# Patient Record
Sex: MALE | Race: WHITE | NOT HISPANIC OR LATINO | Employment: UNEMPLOYED | URBAN - METROPOLITAN AREA
[De-identification: names, ages, dates, MRNs, and addresses within clinical notes are randomized per-mention and may not be internally consistent; named-entity substitution may affect disease eponyms.]

---

## 2019-01-01 ENCOUNTER — HOSPITAL ENCOUNTER (INPATIENT)
Facility: HOSPITAL | Age: 74
LOS: 5 days | DRG: 023 | End: 2019-07-09
Attending: EMERGENCY MEDICINE | Admitting: PSYCHIATRY & NEUROLOGY
Payer: MEDICARE

## 2019-01-01 ENCOUNTER — HOSPITAL ENCOUNTER (OUTPATIENT)
Dept: TELEMEDICINE | Facility: HOSPITAL | Age: 74
Discharge: HOME | End: 2019-07-03
Payer: MEDICARE

## 2019-01-01 VITALS
DIASTOLIC BLOOD PRESSURE: 97 MMHG | HEIGHT: 71 IN | WEIGHT: 132 LBS | SYSTOLIC BLOOD PRESSURE: 163 MMHG | OXYGEN SATURATION: 100 % | BODY MASS INDEX: 18.48 KG/M2 | HEART RATE: 81 BPM | TEMPERATURE: 98 F | RESPIRATION RATE: 20 BRPM

## 2019-01-01 DIAGNOSIS — Z51.5 PALLIATIVE CARE ENCOUNTER: ICD-10-CM

## 2019-01-01 DIAGNOSIS — I63.412 CEREBRAL INFARCTION DUE TO EMBOLISM OF LEFT MIDDLE CEREBRAL ARTERY: ICD-10-CM

## 2019-01-01 DIAGNOSIS — Z45.2 ENCOUNTER FOR CENTRAL LINE PLACEMENT: ICD-10-CM

## 2019-01-01 DIAGNOSIS — R79.89 ELEVATED TROPONIN: ICD-10-CM

## 2019-01-01 DIAGNOSIS — I42.9 CARDIOMYOPATHY, UNSPECIFIED TYPE: ICD-10-CM

## 2019-01-01 DIAGNOSIS — Z71.89 GOALS OF CARE, COUNSELING/DISCUSSION: ICD-10-CM

## 2019-01-01 DIAGNOSIS — I50.41 ACUTE COMBINED SYSTOLIC (CONGESTIVE) AND DIASTOLIC (CONGESTIVE) HEART FAILURE: ICD-10-CM

## 2019-01-01 DIAGNOSIS — I63.9 CEREBROVASCULAR ACCIDENT (CVA), UNSPECIFIED MECHANISM: Primary | ICD-10-CM

## 2019-01-01 DIAGNOSIS — I21.4 NSTEMI (NON-ST ELEVATED MYOCARDIAL INFARCTION): ICD-10-CM

## 2019-01-01 DIAGNOSIS — I63.9 CVA (CEREBRAL VASCULAR ACCIDENT): ICD-10-CM

## 2019-01-01 DIAGNOSIS — J93.9 PNEUMOTHORAX, UNSPECIFIED TYPE: ICD-10-CM

## 2019-01-01 LAB
ABO + RH BLD: NORMAL
ABO + RH BLD: NORMAL
ALBUMIN SERPL BCP-MCNC: 1.7 G/DL (ref 3.5–5.2)
ALBUMIN SERPL BCP-MCNC: 1.7 G/DL (ref 3.5–5.2)
ALBUMIN SERPL BCP-MCNC: 1.8 G/DL (ref 3.5–5.2)
ALBUMIN SERPL BCP-MCNC: 1.9 G/DL (ref 3.5–5.2)
ALBUMIN SERPL BCP-MCNC: 2.2 G/DL (ref 3.5–5.2)
ALBUMIN SERPL BCP-MCNC: 2.3 G/DL (ref 3.5–5.2)
ALBUMIN SERPL BCP-MCNC: 2.4 G/DL (ref 3.5–5.2)
ALBUMIN SERPL BCP-MCNC: 2.6 G/DL (ref 3.5–5.2)
ALLENS TEST: ABNORMAL
ALP SERPL-CCNC: 47 U/L (ref 55–135)
ALP SERPL-CCNC: 50 U/L (ref 55–135)
ALP SERPL-CCNC: 55 U/L (ref 55–135)
ALP SERPL-CCNC: 56 U/L (ref 55–135)
ALP SERPL-CCNC: 56 U/L (ref 55–135)
ALP SERPL-CCNC: 60 U/L (ref 55–135)
ALP SERPL-CCNC: 65 U/L (ref 55–135)
ALP SERPL-CCNC: 73 U/L (ref 55–135)
ALT SERPL W/O P-5'-P-CCNC: 1173 U/L (ref 10–44)
ALT SERPL W/O P-5'-P-CCNC: 1174 U/L (ref 10–44)
ALT SERPL W/O P-5'-P-CCNC: 1197 U/L (ref 10–44)
ALT SERPL W/O P-5'-P-CCNC: 1222 U/L (ref 10–44)
ALT SERPL W/O P-5'-P-CCNC: 1251 U/L (ref 10–44)
ALT SERPL W/O P-5'-P-CCNC: 1279 U/L (ref 10–44)
ALT SERPL W/O P-5'-P-CCNC: 1291 U/L (ref 10–44)
ALT SERPL W/O P-5'-P-CCNC: 24 U/L (ref 10–44)
AMMONIA PLAS-SCNC: 59 UMOL/L (ref 10–50)
AMMONIA PLAS-SCNC: 59 UMOL/L (ref 10–50)
ANION GAP SERPL CALC-SCNC: 10 MMOL/L (ref 8–16)
ANION GAP SERPL CALC-SCNC: 3 MMOL/L (ref 8–16)
ANION GAP SERPL CALC-SCNC: 4 MMOL/L (ref 8–16)
ANION GAP SERPL CALC-SCNC: 6 MMOL/L (ref 8–16)
ANION GAP SERPL CALC-SCNC: 7 MMOL/L (ref 8–16)
ANION GAP SERPL CALC-SCNC: 7 MMOL/L (ref 8–16)
ANION GAP SERPL CALC-SCNC: 8 MMOL/L (ref 8–16)
ANION GAP SERPL CALC-SCNC: ABNORMAL MMOL/L (ref 8–16)
ANISOCYTOSIS BLD QL SMEAR: SLIGHT
APTT BLDCRRT: 29.8 SEC (ref 21–32)
ASCENDING AORTA: 2.83 CM
AST SERPL-CCNC: 1029 U/L (ref 10–40)
AST SERPL-CCNC: 1261 U/L (ref 10–40)
AST SERPL-CCNC: 1329 U/L (ref 10–40)
AST SERPL-CCNC: 1390 U/L (ref 10–40)
AST SERPL-CCNC: 1430 U/L (ref 10–40)
AST SERPL-CCNC: 1506 U/L (ref 10–40)
AST SERPL-CCNC: 2273 U/L (ref 10–40)
AST SERPL-CCNC: 34 U/L (ref 10–40)
AV INDEX (PROSTH): 0.65
AV MEAN GRADIENT: 3 MMHG
AV PEAK GRADIENT: 5 MMHG
AV VALVE AREA: 2.17 CM2
AV VELOCITY RATIO: 0.67
BACTERIA #/AREA URNS AUTO: ABNORMAL /HPF
BASOPHILS # BLD AUTO: 0.01 K/UL (ref 0–0.2)
BASOPHILS # BLD AUTO: 0.02 K/UL (ref 0–0.2)
BASOPHILS # BLD AUTO: 0.02 K/UL (ref 0–0.2)
BASOPHILS # BLD AUTO: 0.03 K/UL (ref 0–0.2)
BASOPHILS NFR BLD: 0.1 % (ref 0–1.9)
BASOPHILS NFR BLD: 0.2 % (ref 0–1.9)
BASOPHILS NFR BLD: 0.3 % (ref 0–1.9)
BILIRUB SERPL-MCNC: 0.4 MG/DL (ref 0.1–1)
BILIRUB SERPL-MCNC: 0.4 MG/DL (ref 0.1–1)
BILIRUB SERPL-MCNC: 0.5 MG/DL (ref 0.1–1)
BILIRUB SERPL-MCNC: 0.5 MG/DL (ref 0.1–1)
BILIRUB SERPL-MCNC: 0.6 MG/DL (ref 0.1–1)
BILIRUB SERPL-MCNC: 1.2 MG/DL (ref 0.1–1)
BILIRUB UR QL STRIP: NEGATIVE
BLD GP AB SCN CELLS X3 SERPL QL: NORMAL
BLD GP AB SCN CELLS X3 SERPL QL: NORMAL
BLD PROD TYP BPU: NORMAL
BLD PROD TYP BPU: NORMAL
BLOOD UNIT EXPIRATION DATE: NORMAL
BLOOD UNIT EXPIRATION DATE: NORMAL
BLOOD UNIT TYPE CODE: 6200
BLOOD UNIT TYPE CODE: 6200
BLOOD UNIT TYPE: NORMAL
BLOOD UNIT TYPE: NORMAL
BNP SERPL-MCNC: 2672 PG/ML (ref 0–99)
BNP SERPL-MCNC: 3219 PG/ML (ref 0–99)
BNP SERPL-MCNC: 3600 PG/ML (ref 0–99)
BNP SERPL-MCNC: >4900 PG/ML (ref 0–99)
BSA FOR ECHO PROCEDURE: 1.73 M2
BUN SERPL-MCNC: 26 MG/DL (ref 8–23)
BUN SERPL-MCNC: 41 MG/DL (ref 8–23)
BUN SERPL-MCNC: 43 MG/DL (ref 8–23)
BUN SERPL-MCNC: 45 MG/DL (ref 8–23)
BUN SERPL-MCNC: 46 MG/DL (ref 8–23)
BUN SERPL-MCNC: 50 MG/DL (ref 8–23)
BUN SERPL-MCNC: 52 MG/DL (ref 8–23)
BUN SERPL-MCNC: 59 MG/DL (ref 8–23)
CALCIUM SERPL-MCNC: 7.9 MG/DL (ref 8.7–10.5)
CALCIUM SERPL-MCNC: 8 MG/DL (ref 8.7–10.5)
CALCIUM SERPL-MCNC: 8.1 MG/DL (ref 8.7–10.5)
CALCIUM SERPL-MCNC: 8.1 MG/DL (ref 8.7–10.5)
CALCIUM SERPL-MCNC: 8.2 MG/DL (ref 8.7–10.5)
CALCIUM SERPL-MCNC: 8.4 MG/DL (ref 8.7–10.5)
CHLORIDE SERPL-SCNC: 109 MMOL/L (ref 95–110)
CHLORIDE SERPL-SCNC: 111 MMOL/L (ref 95–110)
CHLORIDE SERPL-SCNC: 112 MMOL/L (ref 95–110)
CHLORIDE SERPL-SCNC: 123 MMOL/L (ref 95–110)
CHLORIDE SERPL-SCNC: 129 MMOL/L (ref 95–110)
CHLORIDE SERPL-SCNC: 129 MMOL/L (ref 95–110)
CHLORIDE SERPL-SCNC: 130 MMOL/L (ref 95–110)
CHLORIDE SERPL-SCNC: >130 MMOL/L (ref 95–110)
CHOLEST SERPL-MCNC: 170 MG/DL (ref 120–199)
CHOLEST/HDLC SERPL: 5 {RATIO} (ref 2–5)
CLARITY UR REFRACT.AUTO: ABNORMAL
CO2 SERPL-SCNC: 14 MMOL/L (ref 23–29)
CO2 SERPL-SCNC: 16 MMOL/L (ref 23–29)
CO2 SERPL-SCNC: 17 MMOL/L (ref 23–29)
CO2 SERPL-SCNC: 22 MMOL/L (ref 23–29)
CO2 SERPL-SCNC: 23 MMOL/L (ref 23–29)
CO2 SERPL-SCNC: 23 MMOL/L (ref 23–29)
CODING SYSTEM: NORMAL
CODING SYSTEM: NORMAL
COLOR UR AUTO: YELLOW
CREAT SERPL-MCNC: 1.8 MG/DL (ref 0.5–1.4)
CREAT SERPL-MCNC: 2.4 MG/DL (ref 0.5–1.4)
CREAT SERPL-MCNC: 2.5 MG/DL (ref 0.5–1.4)
CREAT SERPL-MCNC: 2.6 MG/DL (ref 0.5–1.4)
CREAT SERPL-MCNC: 2.8 MG/DL (ref 0.5–1.4)
CREAT SERPL-MCNC: 2.9 MG/DL (ref 0.5–1.4)
CV ECHO LV RWT: 0.33 CM
DELSYS: ABNORMAL
DIFFERENTIAL METHOD: ABNORMAL
DISPENSE STATUS: NORMAL
DISPENSE STATUS: NORMAL
DOHLE BOD BLD QL SMEAR: PRESENT
DOHLE BOD BLD QL SMEAR: PRESENT
DOP CALC AO PEAK VEL: 1.08 M/S
DOP CALC AO VTI: 20.95 CM
DOP CALC LVOT AREA: 3.3 CM2
DOP CALC LVOT DIAMETER: 2.06 CM
DOP CALC LVOT PEAK VEL: 0.72 M/S
DOP CALC LVOT STROKE VOLUME: 45.4 CM3
DOP CALCLVOT PEAK VEL VTI: 13.63 CM
E WAVE DECELERATION TIME: 182.61 MSEC
E/A RATIO: 1.05
E/E' RATIO: 8.53 M/S
ECHO LV POSTERIOR WALL: 1.04 CM (ref 0.6–1.1)
EOSINOPHIL # BLD AUTO: 0 K/UL (ref 0–0.5)
EOSINOPHIL NFR BLD: 0 % (ref 0–8)
EOSINOPHIL NFR BLD: 0.1 % (ref 0–8)
ERYTHROCYTE [DISTWIDTH] IN BLOOD BY AUTOMATED COUNT: 15.9 % (ref 11.5–14.5)
ERYTHROCYTE [DISTWIDTH] IN BLOOD BY AUTOMATED COUNT: 15.9 % (ref 11.5–14.5)
ERYTHROCYTE [DISTWIDTH] IN BLOOD BY AUTOMATED COUNT: 16.5 % (ref 11.5–14.5)
ERYTHROCYTE [DISTWIDTH] IN BLOOD BY AUTOMATED COUNT: 16.5 % (ref 11.5–14.5)
ERYTHROCYTE [DISTWIDTH] IN BLOOD BY AUTOMATED COUNT: 16.7 % (ref 11.5–14.5)
ERYTHROCYTE [DISTWIDTH] IN BLOOD BY AUTOMATED COUNT: 16.8 % (ref 11.5–14.5)
ERYTHROCYTE [DISTWIDTH] IN BLOOD BY AUTOMATED COUNT: 17.2 % (ref 11.5–14.5)
ERYTHROCYTE [DISTWIDTH] IN BLOOD BY AUTOMATED COUNT: 17.3 % (ref 11.5–14.5)
ERYTHROCYTE [DISTWIDTH] IN BLOOD BY AUTOMATED COUNT: 18 % (ref 11.5–14.5)
ERYTHROCYTE [DISTWIDTH] IN BLOOD BY AUTOMATED COUNT: 18.5 % (ref 11.5–14.5)
ERYTHROCYTE [DISTWIDTH] IN BLOOD BY AUTOMATED COUNT: 18.7 % (ref 11.5–14.5)
ERYTHROCYTE [SEDIMENTATION RATE] IN BLOOD BY WESTERGREN METHOD: 12 MM/H
ERYTHROCYTE [SEDIMENTATION RATE] IN BLOOD BY WESTERGREN METHOD: 16 MM/H
ERYTHROCYTE [SEDIMENTATION RATE] IN BLOOD BY WESTERGREN METHOD: 20 MM/H
ERYTHROCYTE [SEDIMENTATION RATE] IN BLOOD BY WESTERGREN METHOD: 24 MM/H
EST. GFR  (AFRICAN AMERICAN): 23.9 ML/MIN/1.73 M^2
EST. GFR  (AFRICAN AMERICAN): 24.9 ML/MIN/1.73 M^2
EST. GFR  (AFRICAN AMERICAN): 27.3 ML/MIN/1.73 M^2
EST. GFR  (AFRICAN AMERICAN): 28.6 ML/MIN/1.73 M^2
EST. GFR  (AFRICAN AMERICAN): 30 ML/MIN/1.73 M^2
EST. GFR  (AFRICAN AMERICAN): 42.5 ML/MIN/1.73 M^2
EST. GFR  (NON AFRICAN AMERICAN): 20.7 ML/MIN/1.73 M^2
EST. GFR  (NON AFRICAN AMERICAN): 21.6 ML/MIN/1.73 M^2
EST. GFR  (NON AFRICAN AMERICAN): 23.6 ML/MIN/1.73 M^2
EST. GFR  (NON AFRICAN AMERICAN): 24.7 ML/MIN/1.73 M^2
EST. GFR  (NON AFRICAN AMERICAN): 26 ML/MIN/1.73 M^2
EST. GFR  (NON AFRICAN AMERICAN): 36.8 ML/MIN/1.73 M^2
ESTIMATED AVG GLUCOSE: 103 MG/DL (ref 68–131)
FIBRINOGEN PPP-MCNC: 164 MG/DL (ref 182–366)
FIO2: 100
FIO2: 40
FIO2: 40
FIO2: 50
FLOW: 7
FLOW: 7
FRACTIONAL SHORTENING: 21 % (ref 28–44)
GLUCOSE SERPL-MCNC: 100 MG/DL (ref 70–110)
GLUCOSE SERPL-MCNC: 103 MG/DL (ref 70–110)
GLUCOSE SERPL-MCNC: 111 MG/DL (ref 70–110)
GLUCOSE SERPL-MCNC: 119 MG/DL (ref 70–110)
GLUCOSE SERPL-MCNC: 134 MG/DL (ref 70–110)
GLUCOSE SERPL-MCNC: 164 MG/DL (ref 70–110)
GLUCOSE SERPL-MCNC: 208 MG/DL (ref 70–110)
GLUCOSE SERPL-MCNC: 228 MG/DL (ref 70–110)
GLUCOSE UR QL STRIP: NEGATIVE
HBA1C MFR BLD HPLC: 5.2 % (ref 4–5.6)
HCO3 UR-SCNC: 11.4 MMOL/L (ref 24–28)
HCO3 UR-SCNC: 13.5 MMOL/L (ref 24–28)
HCO3 UR-SCNC: 13.5 MMOL/L (ref 24–28)
HCO3 UR-SCNC: 17.4 MMOL/L (ref 24–28)
HCO3 UR-SCNC: 17.4 MMOL/L (ref 24–28)
HCO3 UR-SCNC: 18 MMOL/L (ref 24–28)
HCO3 UR-SCNC: 18.1 MMOL/L (ref 24–28)
HCO3 UR-SCNC: 18.5 MMOL/L (ref 24–28)
HCO3 UR-SCNC: 18.8 MMOL/L (ref 24–28)
HCO3 UR-SCNC: 19.1 MMOL/L (ref 24–28)
HCO3 UR-SCNC: 19.7 MMOL/L (ref 24–28)
HCO3 UR-SCNC: 19.8 MMOL/L (ref 24–28)
HCO3 UR-SCNC: 20.6 MMOL/L (ref 24–28)
HCO3 UR-SCNC: 22.8 MMOL/L (ref 24–28)
HCO3 UR-SCNC: 22.8 MMOL/L (ref 24–28)
HCO3 UR-SCNC: 22.9 MMOL/L (ref 24–28)
HCO3 UR-SCNC: 23.7 MMOL/L (ref 24–28)
HCO3 UR-SCNC: 24.7 MMOL/L (ref 24–28)
HCO3 UR-SCNC: 25.9 MMOL/L (ref 24–28)
HCO3 UR-SCNC: 25.9 MMOL/L (ref 24–28)
HCO3 UR-SCNC: 29.2 MMOL/L (ref 24–28)
HCO3 UR-SCNC: 31.8 MMOL/L (ref 24–28)
HCT VFR BLD AUTO: 19.5 % (ref 40–54)
HCT VFR BLD AUTO: 20.2 % (ref 40–54)
HCT VFR BLD AUTO: 22.6 % (ref 40–54)
HCT VFR BLD AUTO: 23.5 % (ref 40–54)
HCT VFR BLD AUTO: 23.5 % (ref 40–54)
HCT VFR BLD AUTO: 25 % (ref 40–54)
HCT VFR BLD AUTO: 29 % (ref 40–54)
HCT VFR BLD AUTO: 30 % (ref 40–54)
HCT VFR BLD AUTO: 31.1 % (ref 40–54)
HCT VFR BLD AUTO: 31.2 % (ref 40–54)
HCT VFR BLD AUTO: 35.4 % (ref 40–54)
HCT VFR BLD CALC: 22 %PCV (ref 36–54)
HCT VFR BLD CALC: 23 %PCV (ref 36–54)
HCT VFR BLD CALC: 24 %PCV (ref 36–54)
HCT VFR BLD CALC: 26 %PCV (ref 36–54)
HCT VFR BLD CALC: 33 %PCV (ref 36–54)
HDLC SERPL-MCNC: 34 MG/DL (ref 40–75)
HDLC SERPL: 20 % (ref 20–50)
HGB BLD-MCNC: 11.4 G/DL (ref 14–18)
HGB BLD-MCNC: 6.3 G/DL (ref 14–18)
HGB BLD-MCNC: 6.7 G/DL (ref 14–18)
HGB BLD-MCNC: 7.1 G/DL (ref 14–18)
HGB BLD-MCNC: 7.6 G/DL (ref 14–18)
HGB BLD-MCNC: 7.6 G/DL (ref 14–18)
HGB BLD-MCNC: 8.4 G/DL (ref 14–18)
HGB BLD-MCNC: 9.4 G/DL (ref 14–18)
HGB BLD-MCNC: 9.6 G/DL (ref 14–18)
HGB BLD-MCNC: 9.6 G/DL (ref 14–18)
HGB BLD-MCNC: 9.7 G/DL (ref 14–18)
HGB UR QL STRIP: ABNORMAL
HYALINE CASTS UR QL AUTO: 0 /LPF
HYPOCHROMIA BLD QL SMEAR: ABNORMAL
IMM GRANULOCYTES # BLD AUTO: 0.04 K/UL (ref 0–0.04)
IMM GRANULOCYTES # BLD AUTO: 0.06 K/UL (ref 0–0.04)
IMM GRANULOCYTES # BLD AUTO: 0.07 K/UL (ref 0–0.04)
IMM GRANULOCYTES # BLD AUTO: 0.07 K/UL (ref 0–0.04)
IMM GRANULOCYTES # BLD AUTO: 0.08 K/UL (ref 0–0.04)
IMM GRANULOCYTES # BLD AUTO: 0.09 K/UL (ref 0–0.04)
IMM GRANULOCYTES # BLD AUTO: 0.09 K/UL (ref 0–0.04)
IMM GRANULOCYTES # BLD AUTO: 0.11 K/UL (ref 0–0.04)
IMM GRANULOCYTES # BLD AUTO: 0.14 K/UL (ref 0–0.04)
IMM GRANULOCYTES # BLD AUTO: 0.14 K/UL (ref 0–0.04)
IMM GRANULOCYTES # BLD AUTO: 0.19 K/UL (ref 0–0.04)
IMM GRANULOCYTES NFR BLD AUTO: 0.4 % (ref 0–0.5)
IMM GRANULOCYTES NFR BLD AUTO: 0.6 % (ref 0–0.5)
IMM GRANULOCYTES NFR BLD AUTO: 0.7 % (ref 0–0.5)
IMM GRANULOCYTES NFR BLD AUTO: 0.9 % (ref 0–0.5)
IMM GRANULOCYTES NFR BLD AUTO: 1.2 % (ref 0–0.5)
INR PPP: 1.4 (ref 0.8–1.2)
INR PPP: 1.9 (ref 0.8–1.2)
INR PPP: 1.9 (ref 0.8–1.2)
INR PPP: 2.5 (ref 0.8–1.2)
INTERVENTRICULAR SEPTUM: 1.02 CM (ref 0.6–1.1)
KETONES UR QL STRIP: NEGATIVE
LA MAJOR: 7.06 CM
LA MINOR: 5.64 CM
LA WIDTH: 4.49 CM
LACTATE SERPL-SCNC: 1.4 MMOL/L (ref 0.5–2.2)
LACTATE SERPL-SCNC: 2.4 MMOL/L (ref 0.5–2.2)
LACTATE SERPL-SCNC: 2.8 MMOL/L (ref 0.5–2.2)
LACTATE SERPL-SCNC: 3.4 MMOL/L (ref 0.5–2.2)
LACTATE SERPL-SCNC: 3.4 MMOL/L (ref 0.5–2.2)
LACTATE SERPL-SCNC: 7.6 MMOL/L (ref 0.5–2.2)
LACTATE SERPL-SCNC: 9.2 MMOL/L (ref 0.5–2.2)
LACTATE SERPL-SCNC: 9.2 MMOL/L (ref 0.5–2.2)
LDH SERPL L TO P-CCNC: 2.6 MMOL/L (ref 0.36–1.25)
LDLC SERPL CALC-MCNC: 115.2 MG/DL (ref 63–159)
LEFT ATRIUM SIZE: 3.89 CM
LEFT ATRIUM VOLUME INDEX: 52.7 ML/M2
LEFT ATRIUM VOLUME: 93.09 CM3
LEFT INTERNAL DIMENSION IN SYSTOLE: 4.95 CM (ref 2.1–4)
LEFT VENTRICLE DIASTOLIC VOLUME INDEX: 111.78 ML/M2
LEFT VENTRICLE DIASTOLIC VOLUME: 197.58 ML
LEFT VENTRICLE MASS INDEX: 156 G/M2
LEFT VENTRICLE SYSTOLIC VOLUME INDEX: 65.3 ML/M2
LEFT VENTRICLE SYSTOLIC VOLUME: 115.5 ML
LEFT VENTRICULAR INTERNAL DIMENSION IN DIASTOLE: 6.25 CM (ref 3.5–6)
LEFT VENTRICULAR MASS: 274.92 G
LEUKOCYTE ESTERASE UR QL STRIP: ABNORMAL
LV LATERAL E/E' RATIO: 5.82 M/S
LV SEPTAL E/E' RATIO: 16 M/S
LYMPHOCYTES # BLD AUTO: 0.4 K/UL (ref 1–4.8)
LYMPHOCYTES # BLD AUTO: 0.9 K/UL (ref 1–4.8)
LYMPHOCYTES # BLD AUTO: 1 K/UL (ref 1–4.8)
LYMPHOCYTES # BLD AUTO: 1.1 K/UL (ref 1–4.8)
LYMPHOCYTES # BLD AUTO: 1.3 K/UL (ref 1–4.8)
LYMPHOCYTES # BLD AUTO: 1.3 K/UL (ref 1–4.8)
LYMPHOCYTES # BLD AUTO: 1.4 K/UL (ref 1–4.8)
LYMPHOCYTES # BLD AUTO: 1.4 K/UL (ref 1–4.8)
LYMPHOCYTES # BLD AUTO: 2 K/UL (ref 1–4.8)
LYMPHOCYTES # BLD AUTO: 2.4 K/UL (ref 1–4.8)
LYMPHOCYTES # BLD AUTO: 2.4 K/UL (ref 1–4.8)
LYMPHOCYTES NFR BLD: 10.2 % (ref 18–48)
LYMPHOCYTES NFR BLD: 10.5 % (ref 18–48)
LYMPHOCYTES NFR BLD: 12.9 % (ref 18–48)
LYMPHOCYTES NFR BLD: 12.9 % (ref 18–48)
LYMPHOCYTES NFR BLD: 13.3 % (ref 18–48)
LYMPHOCYTES NFR BLD: 13.7 % (ref 18–48)
LYMPHOCYTES NFR BLD: 19.3 % (ref 18–48)
LYMPHOCYTES NFR BLD: 4.7 % (ref 18–48)
LYMPHOCYTES NFR BLD: 5.8 % (ref 18–48)
LYMPHOCYTES NFR BLD: 7.1 % (ref 18–48)
LYMPHOCYTES NFR BLD: 9.2 % (ref 18–48)
MAGNESIUM SERPL-MCNC: 2 MG/DL (ref 1.6–2.6)
MAGNESIUM SERPL-MCNC: 2 MG/DL (ref 1.6–2.6)
MAGNESIUM SERPL-MCNC: 2.2 MG/DL (ref 1.6–2.6)
MAGNESIUM SERPL-MCNC: 2.4 MG/DL (ref 1.6–2.6)
MAGNESIUM SERPL-MCNC: 2.6 MG/DL (ref 1.6–2.6)
MCH RBC QN AUTO: 29.1 PG (ref 27–31)
MCH RBC QN AUTO: 29.2 PG (ref 27–31)
MCH RBC QN AUTO: 29.4 PG (ref 27–31)
MCH RBC QN AUTO: 29.5 PG (ref 27–31)
MCH RBC QN AUTO: 29.5 PG (ref 27–31)
MCH RBC QN AUTO: 29.6 PG (ref 27–31)
MCH RBC QN AUTO: 29.6 PG (ref 27–31)
MCH RBC QN AUTO: 29.8 PG (ref 27–31)
MCH RBC QN AUTO: 30 PG (ref 27–31)
MCHC RBC AUTO-ENTMCNC: 30.8 G/DL (ref 32–36)
MCHC RBC AUTO-ENTMCNC: 31.2 G/DL (ref 32–36)
MCHC RBC AUTO-ENTMCNC: 31.3 G/DL (ref 32–36)
MCHC RBC AUTO-ENTMCNC: 31.4 G/DL (ref 32–36)
MCHC RBC AUTO-ENTMCNC: 32.2 G/DL (ref 32–36)
MCHC RBC AUTO-ENTMCNC: 32.3 G/DL (ref 32–36)
MCHC RBC AUTO-ENTMCNC: 33.1 G/DL (ref 32–36)
MCHC RBC AUTO-ENTMCNC: 33.2 G/DL (ref 32–36)
MCHC RBC AUTO-ENTMCNC: 33.6 G/DL (ref 32–36)
MCV RBC AUTO: 88 FL (ref 82–98)
MCV RBC AUTO: 89 FL (ref 82–98)
MCV RBC AUTO: 90 FL (ref 82–98)
MCV RBC AUTO: 92 FL (ref 82–98)
MCV RBC AUTO: 93 FL (ref 82–98)
MCV RBC AUTO: 93 FL (ref 82–98)
MCV RBC AUTO: 94 FL (ref 82–98)
MCV RBC AUTO: 95 FL (ref 82–98)
MCV RBC AUTO: 95 FL (ref 82–98)
MICROSCOPIC COMMENT: ABNORMAL
MIN VOL: 10
MIN VOL: 10.2
MIN VOL: 10.7
MIN VOL: 17
MIN VOL: 20.8
MIN VOL: 6.36
MIN VOL: 7.01
MIN VOL: 8.08
MIN VOL: 9
MIN VOL: 9.12
MODE: ABNORMAL
MONOCYTES # BLD AUTO: 0.4 K/UL (ref 0.3–1)
MONOCYTES # BLD AUTO: 0.5 K/UL (ref 0.3–1)
MONOCYTES # BLD AUTO: 0.6 K/UL (ref 0.3–1)
MONOCYTES NFR BLD: 2.5 % (ref 4–15)
MONOCYTES NFR BLD: 3.3 % (ref 4–15)
MONOCYTES NFR BLD: 3.3 % (ref 4–15)
MONOCYTES NFR BLD: 3.4 % (ref 4–15)
MONOCYTES NFR BLD: 4 % (ref 4–15)
MONOCYTES NFR BLD: 4.6 % (ref 4–15)
MONOCYTES NFR BLD: 5 % (ref 4–15)
MONOCYTES NFR BLD: 5 % (ref 4–15)
MONOCYTES NFR BLD: 5.3 % (ref 4–15)
MONOCYTES NFR BLD: 5.3 % (ref 4–15)
MONOCYTES NFR BLD: 5.6 % (ref 4–15)
MV PEAK A VEL: 0.61 M/S
MV PEAK E VEL: 0.64 M/S
NEUTROPHILS # BLD AUTO: 10.2 K/UL (ref 1.8–7.7)
NEUTROPHILS # BLD AUTO: 10.5 K/UL (ref 1.8–7.7)
NEUTROPHILS # BLD AUTO: 14.1 K/UL (ref 1.8–7.7)
NEUTROPHILS # BLD AUTO: 14.1 K/UL (ref 1.8–7.7)
NEUTROPHILS # BLD AUTO: 15.5 K/UL (ref 1.8–7.7)
NEUTROPHILS # BLD AUTO: 15.5 K/UL (ref 1.8–7.7)
NEUTROPHILS # BLD AUTO: 7.2 K/UL (ref 1.8–7.7)
NEUTROPHILS # BLD AUTO: 7.9 K/UL (ref 1.8–7.7)
NEUTROPHILS # BLD AUTO: 8.1 K/UL (ref 1.8–7.7)
NEUTROPHILS # BLD AUTO: 8.7 K/UL (ref 1.8–7.7)
NEUTROPHILS # BLD AUTO: 9.2 K/UL (ref 1.8–7.7)
NEUTROPHILS NFR BLD: 74.7 % (ref 38–73)
NEUTROPHILS NFR BLD: 80.7 % (ref 38–73)
NEUTROPHILS NFR BLD: 81.2 % (ref 38–73)
NEUTROPHILS NFR BLD: 82.9 % (ref 38–73)
NEUTROPHILS NFR BLD: 82.9 % (ref 38–73)
NEUTROPHILS NFR BLD: 84 % (ref 38–73)
NEUTROPHILS NFR BLD: 84.7 % (ref 38–73)
NEUTROPHILS NFR BLD: 84.7 % (ref 38–73)
NEUTROPHILS NFR BLD: 88.5 % (ref 38–73)
NEUTROPHILS NFR BLD: 89.4 % (ref 38–73)
NEUTROPHILS NFR BLD: 89.6 % (ref 38–73)
NITRITE UR QL STRIP: NEGATIVE
NONHDLC SERPL-MCNC: 136 MG/DL
NRBC BLD-RTO: 0 /100 WBC
NRBC BLD-RTO: 0 /100 WBC
NRBC BLD-RTO: 1 /100 WBC
NRBC BLD-RTO: 10 /100 WBC
NRBC BLD-RTO: 10 /100 WBC
NRBC BLD-RTO: 13 /100 WBC
NRBC BLD-RTO: 16 /100 WBC
NRBC BLD-RTO: 2 /100 WBC
OVALOCYTES BLD QL SMEAR: ABNORMAL
OVALOCYTES BLD QL SMEAR: ABNORMAL
PCO2 BLDA: 21.4 MMHG (ref 35–45)
PCO2 BLDA: 24.6 MMHG (ref 35–45)
PCO2 BLDA: 27.3 MMHG (ref 35–45)
PCO2 BLDA: 28.5 MMHG (ref 35–45)
PCO2 BLDA: 28.6 MMHG (ref 35–45)
PCO2 BLDA: 28.9 MMHG (ref 35–45)
PCO2 BLDA: 29.4 MMHG (ref 35–45)
PCO2 BLDA: 30.7 MMHG (ref 35–45)
PCO2 BLDA: 31 MMHG (ref 35–45)
PCO2 BLDA: 31 MMHG (ref 35–45)
PCO2 BLDA: 32.3 MMHG (ref 35–45)
PCO2 BLDA: 32.5 MMHG (ref 35–45)
PCO2 BLDA: 37.4 MMHG (ref 35–45)
PCO2 BLDA: 37.7 MMHG (ref 35–45)
PCO2 BLDA: 38.1 MMHG (ref 35–45)
PCO2 BLDA: 38.3 MMHG (ref 35–45)
PCO2 BLDA: 39.3 MMHG (ref 35–45)
PCO2 BLDA: 40.4 MMHG (ref 35–45)
PCO2 BLDA: 42.3 MMHG (ref 35–45)
PCO2 BLDA: 63.2 MMHG (ref 35–45)
PCO2 BLDA: 64.6 MMHG (ref 35–45)
PCO2 BLDA: 78 MMHG (ref 35–45)
PEEP: 10
PEEP: 5
PH SMN: 7.21 [PH] (ref 7.35–7.45)
PH SMN: 7.22 [PH] (ref 7.35–7.45)
PH SMN: 7.27 [PH] (ref 7.35–7.45)
PH SMN: 7.29 [PH] (ref 7.35–7.45)
PH SMN: 7.34 [PH] (ref 7.35–7.45)
PH SMN: 7.35 [PH] (ref 7.35–7.45)
PH SMN: 7.36 [PH] (ref 7.35–7.45)
PH SMN: 7.37 [PH] (ref 7.35–7.45)
PH SMN: 7.37 [PH] (ref 7.35–7.45)
PH SMN: 7.39 [PH] (ref 7.35–7.45)
PH SMN: 7.4 [PH] (ref 7.35–7.45)
PH SMN: 7.4 [PH] (ref 7.35–7.45)
PH SMN: 7.41 [PH] (ref 7.35–7.45)
PH SMN: 7.41 [PH] (ref 7.35–7.45)
PH SMN: 7.44 [PH] (ref 7.35–7.45)
PH SMN: 7.45 [PH] (ref 7.35–7.45)
PH UR STRIP: 5 [PH] (ref 5–8)
PHOSPHATE SERPL-MCNC: 4.5 MG/DL (ref 2.7–4.5)
PHOSPHATE SERPL-MCNC: 5 MG/DL (ref 2.7–4.5)
PHOSPHATE SERPL-MCNC: 5 MG/DL (ref 2.7–4.5)
PHOSPHATE SERPL-MCNC: 5.1 MG/DL (ref 2.7–4.5)
PHOSPHATE SERPL-MCNC: 6 MG/DL (ref 2.7–4.5)
PHOSPHATE SERPL-MCNC: 6.6 MG/DL (ref 2.7–4.5)
PIP: 15
PIP: 15
PIP: 20
PIP: 21
PIP: 22
PIP: 27
PISA TR MAX VEL: 3.36 M/S
PLATELET # BLD AUTO: 103 K/UL (ref 150–350)
PLATELET # BLD AUTO: 153 K/UL (ref 150–350)
PLATELET # BLD AUTO: 168 K/UL (ref 150–350)
PLATELET # BLD AUTO: 170 K/UL (ref 150–350)
PLATELET # BLD AUTO: 178 K/UL (ref 150–350)
PLATELET # BLD AUTO: 198 K/UL (ref 150–350)
PLATELET # BLD AUTO: 198 K/UL (ref 150–350)
PLATELET # BLD AUTO: 229 K/UL (ref 150–350)
PLATELET # BLD AUTO: 243 K/UL (ref 150–350)
PLATELET # BLD AUTO: 75 K/UL (ref 150–350)
PLATELET # BLD AUTO: 85 K/UL (ref 150–350)
PLATELET BLD QL SMEAR: ABNORMAL
PMV BLD AUTO: 10.7 FL (ref 9.2–12.9)
PMV BLD AUTO: 11.2 FL (ref 9.2–12.9)
PMV BLD AUTO: 11.3 FL (ref 9.2–12.9)
PMV BLD AUTO: 11.4 FL (ref 9.2–12.9)
PMV BLD AUTO: 11.4 FL (ref 9.2–12.9)
PMV BLD AUTO: 11.5 FL (ref 9.2–12.9)
PMV BLD AUTO: 11.6 FL (ref 9.2–12.9)
PMV BLD AUTO: 11.6 FL (ref 9.2–12.9)
PMV BLD AUTO: 12.4 FL (ref 9.2–12.9)
PO2 BLDA: 111 MMHG (ref 80–100)
PO2 BLDA: 128 MMHG (ref 80–100)
PO2 BLDA: 147 MMHG (ref 80–100)
PO2 BLDA: 183 MMHG (ref 80–100)
PO2 BLDA: 189 MMHG (ref 80–100)
PO2 BLDA: 195 MMHG (ref 80–100)
PO2 BLDA: 203 MMHG (ref 80–100)
PO2 BLDA: 219 MMHG (ref 80–100)
PO2 BLDA: 221 MMHG (ref 80–100)
PO2 BLDA: 236 MMHG (ref 80–100)
PO2 BLDA: 246 MMHG (ref 80–100)
PO2 BLDA: 252 MMHG (ref 80–100)
PO2 BLDA: 262 MMHG (ref 80–100)
PO2 BLDA: 262 MMHG (ref 80–100)
PO2 BLDA: 27 MMHG (ref 40–60)
PO2 BLDA: 36 MMHG (ref 40–60)
PO2 BLDA: 39 MMHG (ref 40–60)
PO2 BLDA: 41 MMHG (ref 80–100)
PO2 BLDA: 422 MMHG (ref 80–100)
PO2 BLDA: 453 MMHG (ref 80–100)
PO2 BLDA: 455 MMHG (ref 80–100)
PO2 BLDA: 88 MMHG (ref 40–60)
POC BE: -1 MMOL/L
POC BE: -12 MMOL/L
POC BE: -13 MMOL/L
POC BE: -14 MMOL/L
POC BE: -2 MMOL/L
POC BE: -3 MMOL/L
POC BE: -5 MMOL/L
POC BE: -6 MMOL/L
POC BE: -7 MMOL/L
POC BE: -7 MMOL/L
POC BE: -8 MMOL/L
POC BE: -8 MMOL/L
POC BE: 0 MMOL/L
POC BE: 2 MMOL/L
POC BE: 2 MMOL/L
POC BE: 4 MMOL/L
POC IONIZED CALCIUM: 1.13 MMOL/L (ref 1.06–1.42)
POC IONIZED CALCIUM: 1.13 MMOL/L (ref 1.06–1.42)
POC IONIZED CALCIUM: 1.14 MMOL/L (ref 1.06–1.42)
POC IONIZED CALCIUM: 1.14 MMOL/L (ref 1.06–1.42)
POC IONIZED CALCIUM: 1.22 MMOL/L (ref 1.06–1.42)
POC SATURATED O2: 100 % (ref 95–100)
POC SATURATED O2: 44 % (ref 95–100)
POC SATURATED O2: 69 % (ref 95–100)
POC SATURATED O2: 73 % (ref 95–100)
POC SATURATED O2: 73 % (ref 95–100)
POC SATURATED O2: 97 % (ref 95–100)
POC SATURATED O2: 98 % (ref 95–100)
POC SATURATED O2: 99 % (ref 95–100)
POC SATURATED O2: 99 % (ref 95–100)
POC TCO2: 12 MMOL/L (ref 23–27)
POC TCO2: 14 MMOL/L (ref 23–27)
POC TCO2: 14 MMOL/L (ref 24–29)
POC TCO2: 18 MMOL/L (ref 23–27)
POC TCO2: 18 MMOL/L (ref 24–29)
POC TCO2: 19 MMOL/L (ref 23–27)
POC TCO2: 20 MMOL/L (ref 23–27)
POC TCO2: 20 MMOL/L (ref 24–29)
POC TCO2: 21 MMOL/L (ref 23–27)
POC TCO2: 21 MMOL/L (ref 24–29)
POC TCO2: 22 MMOL/L (ref 23–27)
POC TCO2: 24 MMOL/L (ref 23–27)
POC TCO2: 25 MMOL/L (ref 23–27)
POC TCO2: 26 MMOL/L (ref 23–27)
POC TCO2: 27 MMOL/L (ref 23–27)
POC TCO2: 28 MMOL/L (ref 23–27)
POC TCO2: 31 MMOL/L (ref 23–27)
POC TCO2: 34 MMOL/L (ref 23–27)
POCT GLUCOSE: 100 MG/DL (ref 70–110)
POCT GLUCOSE: 103 MG/DL (ref 70–110)
POCT GLUCOSE: 106 MG/DL (ref 70–110)
POCT GLUCOSE: 112 MG/DL (ref 70–110)
POCT GLUCOSE: 114 MG/DL (ref 70–110)
POCT GLUCOSE: 114 MG/DL (ref 70–110)
POCT GLUCOSE: 116 MG/DL (ref 70–110)
POCT GLUCOSE: 117 MG/DL (ref 70–110)
POCT GLUCOSE: 128 MG/DL (ref 70–110)
POCT GLUCOSE: 136 MG/DL (ref 70–110)
POCT GLUCOSE: 144 MG/DL (ref 70–110)
POCT GLUCOSE: 160 MG/DL (ref 70–110)
POCT GLUCOSE: 169 MG/DL (ref 70–110)
POCT GLUCOSE: 183 MG/DL (ref 70–110)
POCT GLUCOSE: 192 MG/DL (ref 70–110)
POCT GLUCOSE: 193 MG/DL (ref 70–110)
POCT GLUCOSE: 199 MG/DL (ref 70–110)
POCT GLUCOSE: 221 MG/DL (ref 70–110)
POCT GLUCOSE: 221 MG/DL (ref 70–110)
POCT GLUCOSE: 225 MG/DL (ref 70–110)
POCT GLUCOSE: 94 MG/DL (ref 70–110)
POIKILOCYTOSIS BLD QL SMEAR: SLIGHT
POLYCHROMASIA BLD QL SMEAR: ABNORMAL
POTASSIUM BLD-SCNC: 4.4 MMOL/L (ref 3.5–5.1)
POTASSIUM BLD-SCNC: 4.8 MMOL/L (ref 3.5–5.1)
POTASSIUM BLD-SCNC: 4.8 MMOL/L (ref 3.5–5.1)
POTASSIUM BLD-SCNC: 5.1 MMOL/L (ref 3.5–5.1)
POTASSIUM BLD-SCNC: 5.3 MMOL/L (ref 3.5–5.1)
POTASSIUM SERPL-SCNC: 4 MMOL/L (ref 3.5–5.1)
POTASSIUM SERPL-SCNC: 4.2 MMOL/L (ref 3.5–5.1)
POTASSIUM SERPL-SCNC: 4.3 MMOL/L (ref 3.5–5.1)
POTASSIUM SERPL-SCNC: 4.5 MMOL/L (ref 3.5–5.1)
POTASSIUM SERPL-SCNC: 4.6 MMOL/L (ref 3.5–5.1)
POTASSIUM SERPL-SCNC: 4.8 MMOL/L (ref 3.5–5.1)
PROCALCITONIN SERPL IA-MCNC: 0.3 NG/ML
PROT SERPL-MCNC: 10.1 G/DL (ref 6–8.4)
PROT SERPL-MCNC: 8.1 G/DL (ref 6–8.4)
PROT SERPL-MCNC: 8.4 G/DL (ref 6–8.4)
PROT SERPL-MCNC: 8.7 G/DL (ref 6–8.4)
PROT SERPL-MCNC: 8.7 G/DL (ref 6–8.4)
PROT SERPL-MCNC: 9.4 G/DL (ref 6–8.4)
PROT SERPL-MCNC: 9.7 G/DL (ref 6–8.4)
PROT SERPL-MCNC: 9.7 G/DL (ref 6–8.4)
PROT UR QL STRIP: ABNORMAL
PROTHROMBIN TIME: 13.8 SEC (ref 9–12.5)
PROTHROMBIN TIME: 18.5 SEC (ref 9–12.5)
PROTHROMBIN TIME: 18.5 SEC (ref 9–12.5)
PROTHROMBIN TIME: 23.8 SEC (ref 9–12.5)
RA MAJOR: 4.04 CM
RA WIDTH: 3.2 CM
RBC # BLD AUTO: 2.1 M/UL (ref 4.6–6.2)
RBC # BLD AUTO: 2.25 M/UL (ref 4.6–6.2)
RBC # BLD AUTO: 2.4 M/UL (ref 4.6–6.2)
RBC # BLD AUTO: 2.61 M/UL (ref 4.6–6.2)
RBC # BLD AUTO: 2.61 M/UL (ref 4.6–6.2)
RBC # BLD AUTO: 2.84 M/UL (ref 4.6–6.2)
RBC # BLD AUTO: 3.22 M/UL (ref 4.6–6.2)
RBC # BLD AUTO: 3.26 M/UL (ref 4.6–6.2)
RBC # BLD AUTO: 3.29 M/UL (ref 4.6–6.2)
RBC # BLD AUTO: 3.3 M/UL (ref 4.6–6.2)
RBC # BLD AUTO: 3.87 M/UL (ref 4.6–6.2)
RBC #/AREA URNS AUTO: 4 /HPF (ref 0–4)
RIGHT VENTRICULAR END-DIASTOLIC DIMENSION: 3.61 CM
SAMPLE: ABNORMAL
SCHISTOCYTES BLD QL SMEAR: ABNORMAL
SINUS: 3.19 CM
SITE: ABNORMAL
SODIUM BLD-SCNC: 142 MMOL/L (ref 136–145)
SODIUM BLD-SCNC: 142 MMOL/L (ref 136–145)
SODIUM BLD-SCNC: 143 MMOL/L (ref 136–145)
SODIUM BLD-SCNC: 143 MMOL/L (ref 136–145)
SODIUM BLD-SCNC: 168 MMOL/L (ref 136–145)
SODIUM SERPL-SCNC: 133 MMOL/L (ref 136–145)
SODIUM SERPL-SCNC: 135 MMOL/L (ref 136–145)
SODIUM SERPL-SCNC: 136 MMOL/L (ref 136–145)
SODIUM SERPL-SCNC: 137 MMOL/L (ref 136–145)
SODIUM SERPL-SCNC: 140 MMOL/L (ref 136–145)
SODIUM SERPL-SCNC: 141 MMOL/L (ref 136–145)
SODIUM SERPL-SCNC: 149 MMOL/L (ref 136–145)
SODIUM SERPL-SCNC: 151 MMOL/L (ref 136–145)
SODIUM SERPL-SCNC: 152 MMOL/L (ref 136–145)
SODIUM SERPL-SCNC: 154 MMOL/L (ref 136–145)
SODIUM SERPL-SCNC: 154 MMOL/L (ref 136–145)
SODIUM SERPL-SCNC: 155 MMOL/L (ref 136–145)
SODIUM SERPL-SCNC: 156 MMOL/L (ref 136–145)
SODIUM SERPL-SCNC: 157 MMOL/L (ref 136–145)
SODIUM SERPL-SCNC: 158 MMOL/L (ref 136–145)
SODIUM SERPL-SCNC: 159 MMOL/L (ref 136–145)
SP GR UR STRIP: 1.03 (ref 1–1.03)
SP02: 100
SP02: 97
SP02: 98
SP02: 98
SP02: 99
STJ: 3.02 CM
TARGETS BLD QL SMEAR: ABNORMAL
TDI LATERAL: 0.11 M/S
TDI SEPTAL: 0.04 M/S
TDI: 0.08 M/S
TR MAX PG: 45 MMHG
TRANS ERYTHROCYTES VOL PATIENT: NORMAL ML
TRANS ERYTHROCYTES VOL PATIENT: NORMAL ML
TRIGL SERPL-MCNC: 104 MG/DL (ref 30–150)
TROPONIN I SERPL DL<=0.01 NG/ML-MCNC: 12.87 NG/ML (ref 0–0.03)
TROPONIN I SERPL DL<=0.01 NG/ML-MCNC: 18.69 NG/ML (ref 0–0.03)
TROPONIN I SERPL DL<=0.01 NG/ML-MCNC: 2.25 NG/ML (ref 0–0.03)
TROPONIN I SERPL DL<=0.01 NG/ML-MCNC: 22.68 NG/ML (ref 0–0.03)
TROPONIN I SERPL DL<=0.01 NG/ML-MCNC: 25 NG/ML (ref 0–0.03)
TROPONIN I SERPL DL<=0.01 NG/ML-MCNC: 6.95 NG/ML (ref 0–0.03)
TROPONIN I SERPL DL<=0.01 NG/ML-MCNC: >50 NG/ML (ref 0–0.03)
TROPONIN I SERPL DL<=0.01 NG/ML-MCNC: >50 NG/ML (ref 0–0.03)
TSH SERPL DL<=0.005 MIU/L-ACNC: 1.78 UIU/ML (ref 0.4–4)
URN SPEC COLLECT METH UR: ABNORMAL
VT: 400
VT: 420
WBC # BLD AUTO: 10.57 K/UL (ref 3.9–12.7)
WBC # BLD AUTO: 10.82 K/UL (ref 3.9–12.7)
WBC # BLD AUTO: 10.85 K/UL (ref 3.9–12.7)
WBC # BLD AUTO: 12.1 K/UL (ref 3.9–12.7)
WBC # BLD AUTO: 12.5 K/UL (ref 3.9–12.7)
WBC # BLD AUTO: 15.75 K/UL (ref 3.9–12.7)
WBC # BLD AUTO: 15.75 K/UL (ref 3.9–12.7)
WBC # BLD AUTO: 18.72 K/UL (ref 3.9–12.7)
WBC # BLD AUTO: 18.72 K/UL (ref 3.9–12.7)
WBC # BLD AUTO: 8.1 K/UL (ref 3.9–12.7)
WBC # BLD AUTO: 9.99 K/UL (ref 3.9–12.7)
WBC #/AREA URNS AUTO: 10 /HPF (ref 0–5)

## 2019-01-01 PROCEDURE — 63600175 PHARM REV CODE 636 W HCPCS: Performed by: PHYSICIAN ASSISTANT

## 2019-01-01 PROCEDURE — 84100 ASSAY OF PHOSPHORUS: CPT

## 2019-01-01 PROCEDURE — 36620 INSERTION CATHETER ARTERY: CPT | Mod: GC,,, | Performed by: PHYSICIAN ASSISTANT

## 2019-01-01 PROCEDURE — 31500 PR INSERT, EMERGENCY ENDOTRACH AIRWAY: ICD-10-PCS | Mod: GC,,, | Performed by: EMERGENCY MEDICINE

## 2019-01-01 PROCEDURE — 82330 ASSAY OF CALCIUM: CPT

## 2019-01-01 PROCEDURE — 80053 COMPREHEN METABOLIC PANEL: CPT | Mod: 91

## 2019-01-01 PROCEDURE — 63600175 PHARM REV CODE 636 W HCPCS: Performed by: PSYCHIATRY & NEUROLOGY

## 2019-01-01 PROCEDURE — 80053 COMPREHEN METABOLIC PANEL: CPT

## 2019-01-01 PROCEDURE — 25000003 PHARM REV CODE 250: Performed by: NURSE PRACTITIONER

## 2019-01-01 PROCEDURE — 27000221 HC OXYGEN, UP TO 24 HOURS

## 2019-01-01 PROCEDURE — 85610 PROTHROMBIN TIME: CPT

## 2019-01-01 PROCEDURE — 84132 ASSAY OF SERUM POTASSIUM: CPT

## 2019-01-01 PROCEDURE — A4217 STERILE WATER/SALINE, 500 ML: HCPCS | Performed by: NURSE PRACTITIONER

## 2019-01-01 PROCEDURE — 63600175 PHARM REV CODE 636 W HCPCS: Performed by: NURSE PRACTITIONER

## 2019-01-01 PROCEDURE — 93005 ELECTROCARDIOGRAM TRACING: CPT

## 2019-01-01 PROCEDURE — 99291 CRITICAL CARE FIRST HOUR: CPT | Mod: 25,GC,, | Performed by: EMERGENCY MEDICINE

## 2019-01-01 PROCEDURE — 99292 CRITICAL CARE ADDL 30 MIN: CPT | Mod: 25,GC,, | Performed by: PSYCHIATRY & NEUROLOGY

## 2019-01-01 PROCEDURE — 99900035 HC TECH TIME PER 15 MIN (STAT)

## 2019-01-01 PROCEDURE — 99291 PR CRITICAL CARE, E/M 30-74 MINUTES: ICD-10-PCS | Mod: GC,,, | Performed by: PSYCHIATRY & NEUROLOGY

## 2019-01-01 PROCEDURE — 36415 COLL VENOUS BLD VENIPUNCTURE: CPT

## 2019-01-01 PROCEDURE — 85025 COMPLETE CBC W/AUTO DIFF WBC: CPT | Mod: 91

## 2019-01-01 PROCEDURE — 25000003 PHARM REV CODE 250: Performed by: PSYCHIATRY & NEUROLOGY

## 2019-01-01 PROCEDURE — 99223 PR INITIAL HOSPITAL CARE,LEVL III: ICD-10-PCS | Mod: GC,,, | Performed by: PSYCHIATRY & NEUROLOGY

## 2019-01-01 PROCEDURE — 93010 EKG 12-LEAD: ICD-10-PCS | Mod: 77,ICN,, | Performed by: INTERNAL MEDICINE

## 2019-01-01 PROCEDURE — 85014 HEMATOCRIT: CPT

## 2019-01-01 PROCEDURE — 93010 ELECTROCARDIOGRAM REPORT: CPT | Mod: ,,, | Performed by: INTERNAL MEDICINE

## 2019-01-01 PROCEDURE — 86901 BLOOD TYPING SEROLOGIC RH(D): CPT

## 2019-01-01 PROCEDURE — G0425 PR INPT TELEHEALTH CONSULT 30M: ICD-10-PCS | Mod: GT,G0,, | Performed by: PSYCHIATRY & NEUROLOGY

## 2019-01-01 PROCEDURE — 99291 CRITICAL CARE FIRST HOUR: CPT | Mod: GC,,, | Performed by: PSYCHIATRY & NEUROLOGY

## 2019-01-01 PROCEDURE — 84145 PROCALCITONIN (PCT): CPT

## 2019-01-01 PROCEDURE — 99900026 HC AIRWAY MAINTENANCE (STAT)

## 2019-01-01 PROCEDURE — 25000003 PHARM REV CODE 250: Performed by: PHYSICIAN ASSISTANT

## 2019-01-01 PROCEDURE — 99233 SBSQ HOSP IP/OBS HIGH 50: CPT | Mod: GC,,, | Performed by: PSYCHIATRY & NEUROLOGY

## 2019-01-01 PROCEDURE — 83880 ASSAY OF NATRIURETIC PEPTIDE: CPT

## 2019-01-01 PROCEDURE — 99221 PR INITIAL HOSPITAL CARE,LEVL I: ICD-10-PCS | Mod: GC,ICN,CMP, | Performed by: INTERNAL MEDICINE

## 2019-01-01 PROCEDURE — 25000003 PHARM REV CODE 250

## 2019-01-01 PROCEDURE — 36556 INSERT NON-TUNNEL CV CATH: CPT

## 2019-01-01 PROCEDURE — 27200188 HC TRANSDUCER, ART ADULT/PEDS

## 2019-01-01 PROCEDURE — 37799 UNLISTED PX VASCULAR SURGERY: CPT

## 2019-01-01 PROCEDURE — 94761 N-INVAS EAR/PLS OXIMETRY MLT: CPT

## 2019-01-01 PROCEDURE — 27200966 HC CLOSED SUCTION SYSTEM

## 2019-01-01 PROCEDURE — P9021 RED BLOOD CELLS UNIT: HCPCS

## 2019-01-01 PROCEDURE — 82803 BLOOD GASES ANY COMBINATION: CPT

## 2019-01-01 PROCEDURE — 94003 VENT MGMT INPAT SUBQ DAY: CPT

## 2019-01-01 PROCEDURE — 83605 ASSAY OF LACTIC ACID: CPT

## 2019-01-01 PROCEDURE — 83735 ASSAY OF MAGNESIUM: CPT

## 2019-01-01 PROCEDURE — 99292 PR CRITICAL CARE, ADDL 30 MIN: ICD-10-PCS | Mod: 25,GC,, | Performed by: PSYCHIATRY & NEUROLOGY

## 2019-01-01 PROCEDURE — 25000003 PHARM REV CODE 250: Performed by: RADIOLOGY

## 2019-01-01 PROCEDURE — 99233 SBSQ HOSP IP/OBS HIGH 50: CPT | Mod: ,,, | Performed by: PSYCHIATRY & NEUROLOGY

## 2019-01-01 PROCEDURE — 99233 PR SUBSEQUENT HOSPITAL CARE,LEVL III: ICD-10-PCS | Mod: ,,, | Performed by: PSYCHIATRY & NEUROLOGY

## 2019-01-01 PROCEDURE — G0425 INPT/ED TELECONSULT30: HCPCS | Mod: GT,G0,, | Performed by: PSYCHIATRY & NEUROLOGY

## 2019-01-01 PROCEDURE — 99291 PR CRITICAL CARE, E/M 30-74 MINUTES: ICD-10-PCS | Mod: 25,GC,, | Performed by: EMERGENCY MEDICINE

## 2019-01-01 PROCEDURE — 84484 ASSAY OF TROPONIN QUANT: CPT

## 2019-01-01 PROCEDURE — 85730 THROMBOPLASTIN TIME PARTIAL: CPT

## 2019-01-01 PROCEDURE — 93010 ELECTROCARDIOGRAM REPORT: CPT | Mod: 77,ICN,, | Performed by: INTERNAL MEDICINE

## 2019-01-01 PROCEDURE — 82800 BLOOD PH: CPT

## 2019-01-01 PROCEDURE — 81001 URINALYSIS AUTO W/SCOPE: CPT

## 2019-01-01 PROCEDURE — 25500020 PHARM REV CODE 255: Performed by: EMERGENCY MEDICINE

## 2019-01-01 PROCEDURE — S0030 INJECTION, METRONIDAZOLE: HCPCS | Performed by: PSYCHIATRY & NEUROLOGY

## 2019-01-01 PROCEDURE — 83036 HEMOGLOBIN GLYCOSYLATED A1C: CPT

## 2019-01-01 PROCEDURE — 86920 COMPATIBILITY TEST SPIN: CPT

## 2019-01-01 PROCEDURE — 84295 ASSAY OF SERUM SODIUM: CPT

## 2019-01-01 PROCEDURE — 20000000 HC ICU ROOM

## 2019-01-01 PROCEDURE — 84295 ASSAY OF SERUM SODIUM: CPT | Mod: 91

## 2019-01-01 PROCEDURE — 99291 CRITICAL CARE FIRST HOUR: CPT | Mod: 25

## 2019-01-01 PROCEDURE — 36620 PR INSERT CATH,ART,PERCUT,SHORTTERM: ICD-10-PCS | Mod: GC,,, | Performed by: PHYSICIAN ASSISTANT

## 2019-01-01 PROCEDURE — 99221 1ST HOSP IP/OBS SF/LOW 40: CPT | Mod: GC,ICN,CMP, | Performed by: INTERNAL MEDICINE

## 2019-01-01 PROCEDURE — 83880 ASSAY OF NATRIURETIC PEPTIDE: CPT | Mod: 91

## 2019-01-01 PROCEDURE — 99222 PR INITIAL HOSPITAL CARE,LEVL II: ICD-10-PCS | Mod: ,,, | Performed by: CLINICAL NURSE SPECIALIST

## 2019-01-01 PROCEDURE — 99233 PR SUBSEQUENT HOSPITAL CARE,LEVL III: ICD-10-PCS | Mod: GC,,, | Performed by: PSYCHIATRY & NEUROLOGY

## 2019-01-01 PROCEDURE — 99223 1ST HOSP IP/OBS HIGH 75: CPT | Mod: GC,,, | Performed by: PSYCHIATRY & NEUROLOGY

## 2019-01-01 PROCEDURE — 80061 LIPID PANEL: CPT

## 2019-01-01 PROCEDURE — 99291 PR CRITICAL CARE, E/M 30-74 MINUTES: ICD-10-PCS | Mod: 25,,, | Performed by: NURSE PRACTITIONER

## 2019-01-01 PROCEDURE — 86850 RBC ANTIBODY SCREEN: CPT

## 2019-01-01 PROCEDURE — 94002 VENT MGMT INPAT INIT DAY: CPT

## 2019-01-01 PROCEDURE — 85025 COMPLETE CBC W/AUTO DIFF WBC: CPT

## 2019-01-01 PROCEDURE — 31500 INSERT EMERGENCY AIRWAY: CPT | Mod: GC,,, | Performed by: EMERGENCY MEDICINE

## 2019-01-01 PROCEDURE — 93010 EKG 12-LEAD: ICD-10-PCS | Mod: ,,, | Performed by: INTERNAL MEDICINE

## 2019-01-01 PROCEDURE — 82140 ASSAY OF AMMONIA: CPT

## 2019-01-01 PROCEDURE — 84443 ASSAY THYROID STIM HORMONE: CPT

## 2019-01-01 PROCEDURE — 63600175 PHARM REV CODE 636 W HCPCS

## 2019-01-01 PROCEDURE — 99291 CRITICAL CARE FIRST HOUR: CPT | Mod: 25,,, | Performed by: NURSE PRACTITIONER

## 2019-01-01 PROCEDURE — 96374 THER/PROPH/DIAG INJ IV PUSH: CPT

## 2019-01-01 PROCEDURE — 97167 OT EVAL HIGH COMPLEX 60 MIN: CPT

## 2019-01-01 PROCEDURE — 83605 ASSAY OF LACTIC ACID: CPT | Mod: 91

## 2019-01-01 PROCEDURE — 84100 ASSAY OF PHOSPHORUS: CPT | Mod: 91

## 2019-01-01 PROCEDURE — 83735 ASSAY OF MAGNESIUM: CPT | Mod: 91

## 2019-01-01 PROCEDURE — 97110 THERAPEUTIC EXERCISES: CPT

## 2019-01-01 PROCEDURE — 36620 INSERTION CATHETER ARTERY: CPT

## 2019-01-01 PROCEDURE — 93010 EKG 12-LEAD: ICD-10-PCS | Mod: 76,,, | Performed by: INTERNAL MEDICINE

## 2019-01-01 PROCEDURE — 82962 GLUCOSE BLOOD TEST: CPT

## 2019-01-01 PROCEDURE — 36430 TRANSFUSION BLD/BLD COMPNT: CPT

## 2019-01-01 PROCEDURE — 99222 1ST HOSP IP/OBS MODERATE 55: CPT | Mod: ,,, | Performed by: CLINICAL NURSE SPECIALIST

## 2019-01-01 PROCEDURE — 84484 ASSAY OF TROPONIN QUANT: CPT | Mod: 91

## 2019-01-01 PROCEDURE — 93010 ELECTROCARDIOGRAM REPORT: CPT | Mod: 76,,, | Performed by: INTERNAL MEDICINE

## 2019-01-01 PROCEDURE — 32551 INSERTION OF CHEST TUBE: CPT

## 2019-01-01 PROCEDURE — A4217 STERILE WATER/SALINE, 500 ML: HCPCS | Performed by: PHYSICIAN ASSISTANT

## 2019-01-01 PROCEDURE — 85384 FIBRINOGEN ACTIVITY: CPT

## 2019-01-01 PROCEDURE — 31500 INSERT EMERGENCY AIRWAY: CPT

## 2019-01-01 RX ORDER — NOREPINEPHRINE BITARTRATE/D5W 4MG/250ML
PLASTIC BAG, INJECTION (ML) INTRAVENOUS
Status: DISPENSED
Start: 2019-01-01 | End: 2019-01-01

## 2019-01-01 RX ORDER — METRONIDAZOLE 500 MG/100ML
500 INJECTION, SOLUTION INTRAVENOUS
Status: DISCONTINUED | OUTPATIENT
Start: 2019-01-01 | End: 2019-01-01

## 2019-01-01 RX ORDER — NOREPINEPHRINE BITARTRATE/D5W 4MG/250ML
0.02 PLASTIC BAG, INJECTION (ML) INTRAVENOUS CONTINUOUS
Status: DISCONTINUED | OUTPATIENT
Start: 2019-01-01 | End: 2019-01-01

## 2019-01-01 RX ORDER — SUCCINYLCHOLINE CHLORIDE 20 MG/ML
INJECTION INTRAMUSCULAR; INTRAVENOUS
Status: COMPLETED
Start: 2019-01-01 | End: 2019-01-01

## 2019-01-01 RX ORDER — FUROSEMIDE 10 MG/ML
INJECTION INTRAMUSCULAR; INTRAVENOUS
Status: COMPLETED
Start: 2019-01-01 | End: 2019-01-01

## 2019-01-01 RX ORDER — NAPROXEN SODIUM 220 MG/1
81 TABLET, FILM COATED ORAL DAILY
Status: DISCONTINUED | OUTPATIENT
Start: 2019-01-01 | End: 2019-01-01 | Stop reason: HOSPADM

## 2019-01-01 RX ORDER — SODIUM CHLORIDE, SODIUM LACTATE, POTASSIUM CHLORIDE, CALCIUM CHLORIDE 600; 310; 30; 20 MG/100ML; MG/100ML; MG/100ML; MG/100ML
INJECTION, SOLUTION INTRAVENOUS CONTINUOUS
Status: DISCONTINUED | OUTPATIENT
Start: 2019-01-01 | End: 2019-01-01

## 2019-01-01 RX ORDER — HYDROCODONE BITARTRATE AND ACETAMINOPHEN 500; 5 MG/1; MG/1
TABLET ORAL
Status: DISCONTINUED | OUTPATIENT
Start: 2019-01-01 | End: 2019-01-01

## 2019-01-01 RX ORDER — CHLORHEXIDINE GLUCONATE ORAL RINSE 1.2 MG/ML
15 SOLUTION DENTAL 2 TIMES DAILY
Status: DISCONTINUED | OUTPATIENT
Start: 2019-01-01 | End: 2019-01-01 | Stop reason: HOSPADM

## 2019-01-01 RX ORDER — FENTANYL CITRATE 50 UG/ML
25 INJECTION, SOLUTION INTRAMUSCULAR; INTRAVENOUS
Status: DISCONTINUED | OUTPATIENT
Start: 2019-01-01 | End: 2019-01-01 | Stop reason: HOSPADM

## 2019-01-01 RX ORDER — FENTANYL CITRATE 50 UG/ML
25 INJECTION, SOLUTION INTRAMUSCULAR; INTRAVENOUS
Status: DISCONTINUED | OUTPATIENT
Start: 2019-01-01 | End: 2019-01-01

## 2019-01-01 RX ORDER — SODIUM CHLORIDE 0.9 % (FLUSH) 0.9 %
10 SYRINGE (ML) INJECTION
Status: DISCONTINUED | OUTPATIENT
Start: 2019-01-01 | End: 2019-01-01 | Stop reason: HOSPADM

## 2019-01-01 RX ORDER — LIDOCAINE HYDROCHLORIDE 10 MG/ML
INJECTION, SOLUTION EPIDURAL; INFILTRATION; INTRACAUDAL; PERINEURAL
Status: COMPLETED
Start: 2019-01-01 | End: 2019-01-01

## 2019-01-01 RX ORDER — MILRINONE LACTATE 0.2 MG/ML
0.12 INJECTION, SOLUTION INTRAVENOUS CONTINUOUS
Status: DISCONTINUED | OUTPATIENT
Start: 2019-01-01 | End: 2019-01-01

## 2019-01-01 RX ORDER — LIDOCAINE HYDROCHLORIDE 10 MG/ML
10 INJECTION INFILTRATION; PERINEURAL ONCE
Status: COMPLETED | OUTPATIENT
Start: 2019-01-01 | End: 2019-01-01

## 2019-01-01 RX ORDER — METOPROLOL TARTRATE 25 MG/1
25 TABLET, FILM COATED ORAL 2 TIMES DAILY
Status: DISCONTINUED | OUTPATIENT
Start: 2019-01-01 | End: 2019-01-01

## 2019-01-01 RX ORDER — FUROSEMIDE 10 MG/ML
40 INJECTION INTRAMUSCULAR; INTRAVENOUS ONCE
Status: COMPLETED | OUTPATIENT
Start: 2019-01-01 | End: 2019-01-01

## 2019-01-01 RX ORDER — AMOXICILLIN 250 MG
1 CAPSULE ORAL DAILY
Status: DISCONTINUED | OUTPATIENT
Start: 2019-01-01 | End: 2019-01-01 | Stop reason: HOSPADM

## 2019-01-01 RX ORDER — NICARDIPINE HYDROCHLORIDE 0.2 MG/ML
5 INJECTION INTRAVENOUS CONTINUOUS
Status: DISCONTINUED | OUTPATIENT
Start: 2019-01-01 | End: 2019-01-01

## 2019-01-01 RX ORDER — SODIUM CHLORIDE 9 MG/ML
2000 INJECTION, SOLUTION INTRAVENOUS ONCE
Status: DISCONTINUED | OUTPATIENT
Start: 2019-01-01 | End: 2019-01-01 | Stop reason: HOSPADM

## 2019-01-01 RX ORDER — PHENYLEPHRINE HCL IN 0.9% NACL 1 MG/10 ML
50 SYRINGE (ML) INTRAVENOUS ONCE
Status: DISCONTINUED | OUTPATIENT
Start: 2019-01-01 | End: 2019-01-01

## 2019-01-01 RX ORDER — DEXMEDETOMIDINE HYDROCHLORIDE 4 UG/ML
INJECTION, SOLUTION INTRAVENOUS
Status: COMPLETED
Start: 2019-01-01 | End: 2019-01-01

## 2019-01-01 RX ORDER — ATORVASTATIN CALCIUM 20 MG/1
40 TABLET, FILM COATED ORAL DAILY
Status: DISCONTINUED | OUTPATIENT
Start: 2019-01-01 | End: 2019-01-01

## 2019-01-01 RX ORDER — FAMOTIDINE 20 MG/1
20 TABLET, FILM COATED ORAL 2 TIMES DAILY
Status: DISCONTINUED | OUTPATIENT
Start: 2019-01-01 | End: 2019-01-01

## 2019-01-01 RX ORDER — GLUCAGON 1 MG
1 KIT INJECTION
Status: DISCONTINUED | OUTPATIENT
Start: 2019-01-01 | End: 2019-01-01 | Stop reason: HOSPADM

## 2019-01-01 RX ORDER — ETOMIDATE 2 MG/ML
20 INJECTION INTRAVENOUS
Status: COMPLETED | OUTPATIENT
Start: 2019-01-01 | End: 2019-01-01

## 2019-01-01 RX ORDER — VASOPRESSIN 20 [USP'U]/ML
INJECTION, SOLUTION INTRAMUSCULAR; SUBCUTANEOUS
Status: COMPLETED
Start: 2019-01-01 | End: 2019-01-01

## 2019-01-01 RX ORDER — SUCCINYLCHOLINE CHLORIDE 20 MG/ML
80 INJECTION INTRAMUSCULAR; INTRAVENOUS
Status: COMPLETED | OUTPATIENT
Start: 2019-01-01 | End: 2019-01-01

## 2019-01-01 RX ORDER — FAMOTIDINE 20 MG/1
20 TABLET, FILM COATED ORAL DAILY
Status: DISCONTINUED | OUTPATIENT
Start: 2019-01-01 | End: 2019-01-01 | Stop reason: HOSPADM

## 2019-01-01 RX ORDER — METOPROLOL TARTRATE 25 MG/1
12.5 TABLET ORAL 2 TIMES DAILY
Status: DISCONTINUED | OUTPATIENT
Start: 2019-01-01 | End: 2019-01-01

## 2019-01-01 RX ORDER — DEXMEDETOMIDINE HYDROCHLORIDE 4 UG/ML
0.2 INJECTION, SOLUTION INTRAVENOUS CONTINUOUS
Status: DISCONTINUED | OUTPATIENT
Start: 2019-01-01 | End: 2019-01-01

## 2019-01-01 RX ORDER — FENTANYL CITRATE 50 UG/ML
25 INJECTION, SOLUTION INTRAMUSCULAR; INTRAVENOUS ONCE
Status: COMPLETED | OUTPATIENT
Start: 2019-01-01 | End: 2019-01-01

## 2019-01-01 RX ORDER — NOREPINEPHRINE BITARTRATE 1 MG/ML
INJECTION, SOLUTION INTRAVENOUS
Status: COMPLETED
Start: 2019-01-01 | End: 2019-01-01

## 2019-01-01 RX ORDER — CEFTRIAXONE 1 G/1
1 INJECTION, POWDER, FOR SOLUTION INTRAMUSCULAR; INTRAVENOUS
Status: DISCONTINUED | OUTPATIENT
Start: 2019-01-01 | End: 2019-01-01

## 2019-01-01 RX ORDER — INSULIN ASPART 100 [IU]/ML
1-10 INJECTION, SOLUTION INTRAVENOUS; SUBCUTANEOUS EVERY 6 HOURS PRN
Status: DISCONTINUED | OUTPATIENT
Start: 2019-01-01 | End: 2019-01-01 | Stop reason: HOSPADM

## 2019-01-01 RX ORDER — ETOMIDATE 2 MG/ML
INJECTION INTRAVENOUS
Status: COMPLETED
Start: 2019-01-01 | End: 2019-01-01

## 2019-01-01 RX ORDER — FUROSEMIDE 10 MG/ML
120 INJECTION INTRAMUSCULAR; INTRAVENOUS ONCE
Status: COMPLETED | OUTPATIENT
Start: 2019-01-01 | End: 2019-01-01

## 2019-01-01 RX ORDER — SODIUM CHLORIDE 9 MG/ML
INJECTION, SOLUTION INTRAVENOUS CONTINUOUS
Status: DISCONTINUED | OUTPATIENT
Start: 2019-01-01 | End: 2019-01-01

## 2019-01-01 RX ORDER — CHLORHEXIDINE GLUCONATE ORAL RINSE 1.2 MG/ML
15 SOLUTION DENTAL 4 TIMES DAILY
Status: DISCONTINUED | OUTPATIENT
Start: 2019-01-01 | End: 2019-01-01

## 2019-01-01 RX ORDER — PHENYLEPHRINE HCL IN 0.9% NACL 1 MG/10 ML
SYRINGE (ML) INTRAVENOUS
Status: DISPENSED
Start: 2019-01-01 | End: 2019-01-01

## 2019-01-01 RX ORDER — IODIXANOL 320 MG/ML
150 INJECTION, SOLUTION INTRAVASCULAR
Status: COMPLETED | OUTPATIENT
Start: 2019-01-01 | End: 2019-01-01

## 2019-01-01 RX ADMIN — CHLORHEXIDINE GLUCONATE 0.12% ORAL RINSE 15 ML: 1.2 LIQUID ORAL at 08:07

## 2019-01-01 RX ADMIN — HYDROCORTISONE SODIUM SUCCINATE 100 MG: 100 INJECTION, POWDER, FOR SOLUTION INTRAMUSCULAR; INTRAVENOUS at 06:07

## 2019-01-01 RX ADMIN — MILRINONE LACTATE 0.12 MCG/KG/MIN: 200 INJECTION, SOLUTION INTRAVENOUS at 01:07

## 2019-01-01 RX ADMIN — FAMOTIDINE 20 MG: 20 TABLET, FILM COATED ORAL at 08:07

## 2019-01-01 RX ADMIN — CHLORHEXIDINE GLUCONATE 0.12% ORAL RINSE 15 ML: 1.2 LIQUID ORAL at 09:07

## 2019-01-01 RX ADMIN — FUROSEMIDE 120 MG: 10 INJECTION INTRAMUSCULAR; INTRAVENOUS at 11:07

## 2019-01-01 RX ADMIN — METRONIDAZOLE 500 MG: 500 INJECTION, SOLUTION INTRAVENOUS at 10:07

## 2019-01-01 RX ADMIN — ASPIRIN 81 MG CHEWABLE TABLET 81 MG: 81 TABLET CHEWABLE at 08:07

## 2019-01-01 RX ADMIN — METRONIDAZOLE 500 MG: 500 INJECTION, SOLUTION INTRAVENOUS at 09:07

## 2019-01-01 RX ADMIN — VASOPRESSIN 0.04 UNITS/MIN: 20 INJECTION INTRAVENOUS at 08:07

## 2019-01-01 RX ADMIN — FAMOTIDINE 20 MG: 20 TABLET, FILM COATED ORAL at 09:07

## 2019-01-01 RX ADMIN — SODIUM CHLORIDE, SODIUM LACTATE, POTASSIUM CHLORIDE, AND CALCIUM CHLORIDE: .6; .31; .03; .02 INJECTION, SOLUTION INTRAVENOUS at 03:07

## 2019-01-01 RX ADMIN — SENNOSIDES,DOCUSATE SODIUM 1 TABLET: 8.6; 5 TABLET, FILM COATED ORAL at 09:07

## 2019-01-01 RX ADMIN — FUROSEMIDE 40 MG: 10 INJECTION, SOLUTION INTRAMUSCULAR; INTRAVENOUS at 10:07

## 2019-01-01 RX ADMIN — CHLORHEXIDINE GLUCONATE 0.12% ORAL RINSE 15 ML: 1.2 LIQUID ORAL at 03:07

## 2019-01-01 RX ADMIN — Medication 0.02 MCG/KG/MIN: at 09:07

## 2019-01-01 RX ADMIN — SODIUM CHLORIDE: 234 INJECTION INTRAMUSCULAR; INTRAVENOUS; SUBCUTANEOUS at 03:07

## 2019-01-01 RX ADMIN — VASOPRESSIN 0.04 UNITS/MIN: 20 INJECTION INTRAVENOUS at 10:07

## 2019-01-01 RX ADMIN — METRONIDAZOLE 500 MG: 500 INJECTION, SOLUTION INTRAVENOUS at 02:07

## 2019-01-01 RX ADMIN — METRONIDAZOLE 500 MG: 500 INJECTION, SOLUTION INTRAVENOUS at 06:07

## 2019-01-01 RX ADMIN — INSULIN ASPART 2 UNITS: 100 INJECTION, SOLUTION INTRAVENOUS; SUBCUTANEOUS at 05:07

## 2019-01-01 RX ADMIN — DEXMEDETOMIDINE HYDROCHLORIDE 0.5 MCG/KG/HR: 100 INJECTION, SOLUTION, CONCENTRATE INTRAVENOUS at 11:07

## 2019-01-01 RX ADMIN — FENTANYL CITRATE 25 MCG: 50 INJECTION INTRAMUSCULAR; INTRAVENOUS at 11:07

## 2019-01-01 RX ADMIN — HYDROCORTISONE SODIUM SUCCINATE 100 MG: 100 INJECTION, POWDER, FOR SOLUTION INTRAMUSCULAR; INTRAVENOUS at 01:07

## 2019-01-01 RX ADMIN — Medication 0.02 MCG/KG/MIN: at 10:07

## 2019-01-01 RX ADMIN — SODIUM CHLORIDE: 234 INJECTION INTRAMUSCULAR; INTRAVENOUS; SUBCUTANEOUS at 04:07

## 2019-01-01 RX ADMIN — FENTANYL CITRATE 25 MCG: 50 INJECTION INTRAMUSCULAR; INTRAVENOUS at 07:07

## 2019-01-01 RX ADMIN — FENTANYL CITRATE 25 MCG: 50 INJECTION INTRAMUSCULAR; INTRAVENOUS at 08:07

## 2019-01-01 RX ADMIN — VASOPRESSIN 0.04 UNITS/MIN: 20 INJECTION INTRAVENOUS at 02:07

## 2019-01-01 RX ADMIN — SODIUM CHLORIDE: 234 INJECTION INTRAMUSCULAR; INTRAVENOUS; SUBCUTANEOUS at 08:07

## 2019-01-01 RX ADMIN — SODIUM CHLORIDE 250 ML: 0.9 INJECTION, SOLUTION INTRAVENOUS at 11:07

## 2019-01-01 RX ADMIN — LIDOCAINE HYDROCHLORIDE: 10 INJECTION INFILTRATION; PERINEURAL at 04:07

## 2019-01-01 RX ADMIN — ASPIRIN 81 MG CHEWABLE TABLET 81 MG: 81 TABLET CHEWABLE at 09:07

## 2019-01-01 RX ADMIN — SODIUM CHLORIDE: 0.9 INJECTION, SOLUTION INTRAVENOUS at 02:07

## 2019-01-01 RX ADMIN — CEFTRIAXONE SODIUM 2 G: 1 INJECTION, POWDER, FOR SOLUTION INTRAMUSCULAR; INTRAVENOUS at 10:07

## 2019-01-01 RX ADMIN — FUROSEMIDE 120 MG: 10 INJECTION, SOLUTION INTRAMUSCULAR; INTRAVENOUS at 11:07

## 2019-01-01 RX ADMIN — SENNOSIDES,DOCUSATE SODIUM 1 TABLET: 8.6; 5 TABLET, FILM COATED ORAL at 08:07

## 2019-01-01 RX ADMIN — FENTANYL CITRATE 25 MCG: 50 INJECTION INTRAMUSCULAR; INTRAVENOUS at 03:07

## 2019-01-01 RX ADMIN — ETOMIDATE 20 MG: 2 INJECTION INTRAVENOUS at 11:07

## 2019-01-01 RX ADMIN — SODIUM CHLORIDE, SODIUM LACTATE, POTASSIUM CHLORIDE, AND CALCIUM CHLORIDE: .6; .31; .03; .02 INJECTION, SOLUTION INTRAVENOUS at 09:07

## 2019-01-01 RX ADMIN — SODIUM CHLORIDE 250 ML: 0.9 INJECTION, SOLUTION INTRAVENOUS at 10:07

## 2019-01-01 RX ADMIN — INSULIN ASPART 1 UNITS: 100 INJECTION, SOLUTION INTRAVENOUS; SUBCUTANEOUS at 11:07

## 2019-01-01 RX ADMIN — SODIUM CHLORIDE 250 ML: 0.9 INJECTION, SOLUTION INTRAVENOUS at 09:07

## 2019-01-01 RX ADMIN — FENTANYL CITRATE 25 MCG: 50 INJECTION INTRAMUSCULAR; INTRAVENOUS at 12:07

## 2019-01-01 RX ADMIN — CEFTRIAXONE SODIUM 2 G: 1 INJECTION, POWDER, FOR SOLUTION INTRAMUSCULAR; INTRAVENOUS at 09:07

## 2019-01-01 RX ADMIN — CHLORHEXIDINE GLUCONATE 0.12% ORAL RINSE 15 ML: 1.2 LIQUID ORAL at 01:07

## 2019-01-01 RX ADMIN — FUROSEMIDE 40 MG: 10 INJECTION, SOLUTION INTRAMUSCULAR; INTRAVENOUS at 11:07

## 2019-01-01 RX ADMIN — ASPIRIN 81 MG CHEWABLE TABLET 81 MG: 81 TABLET CHEWABLE at 07:07

## 2019-01-01 RX ADMIN — METRONIDAZOLE 500 MG: 500 SOLUTION INTRAVENOUS at 10:07

## 2019-01-01 RX ADMIN — SUCCINYLCHOLINE CHLORIDE 80 MG: 20 INJECTION, SOLUTION INTRAMUSCULAR; INTRAVENOUS at 11:07

## 2019-01-01 RX ADMIN — IOHEXOL 100 ML: 350 INJECTION, SOLUTION INTRAVENOUS at 12:07

## 2019-01-01 RX ADMIN — METRONIDAZOLE 500 MG: 500 SOLUTION INTRAVENOUS at 01:07

## 2019-01-01 RX ADMIN — HYDROCORTISONE SODIUM SUCCINATE 100 MG: 100 INJECTION, POWDER, FOR SOLUTION INTRAMUSCULAR; INTRAVENOUS at 09:07

## 2019-01-01 RX ADMIN — VASOPRESSIN 0.04 UNITS/MIN: 20 INJECTION INTRAVENOUS at 11:07

## 2019-01-01 RX ADMIN — NOREPINEPHRINE BITARTRATE 0.18 MCG/KG/MIN: 1 INJECTION, SOLUTION, CONCENTRATE INTRAVENOUS at 11:07

## 2019-01-01 RX ADMIN — NOREPINEPHRINE BITARTRATE 4000 MCG: 1 INJECTION, SOLUTION, CONCENTRATE INTRAVENOUS at 09:07

## 2019-01-01 RX ADMIN — FENTANYL CITRATE 25 MCG: 50 INJECTION INTRAMUSCULAR; INTRAVENOUS at 04:07

## 2019-01-01 RX ADMIN — NICARDIPINE HYDROCHLORIDE 5 MG/HR: 0.2 INJECTION, SOLUTION INTRAVENOUS at 11:07

## 2019-01-01 RX ADMIN — SUCCINYLCHOLINE CHLORIDE 80 MG: 20 INJECTION INTRAMUSCULAR; INTRAVENOUS at 11:07

## 2019-01-01 RX ADMIN — SODIUM CHLORIDE 1000 ML: 0.9 INJECTION, SOLUTION INTRAVENOUS at 08:07

## 2019-01-01 RX ADMIN — METOPROLOL TARTRATE 25 MG: 25 TABLET ORAL at 07:07

## 2019-01-01 RX ADMIN — INSULIN ASPART 2 UNITS: 100 INJECTION, SOLUTION INTRAVENOUS; SUBCUTANEOUS at 12:07

## 2019-01-01 RX ADMIN — SODIUM CHLORIDE 250 ML: 0.9 INJECTION, SOLUTION INTRAVENOUS at 03:07

## 2019-01-01 RX ADMIN — ATORVASTATIN CALCIUM 40 MG: 20 TABLET, FILM COATED ORAL at 08:07

## 2019-01-01 RX ADMIN — DEXMEDETOMIDINE HYDROCHLORIDE 0.5 MCG/KG/HR: 4 INJECTION, SOLUTION INTRAVENOUS at 11:07

## 2019-01-01 RX ADMIN — INSULIN ASPART 2 UNITS: 100 INJECTION, SOLUTION INTRAVENOUS; SUBCUTANEOUS at 06:07

## 2019-01-01 RX ADMIN — METRONIDAZOLE 500 MG: 500 SOLUTION INTRAVENOUS at 06:07

## 2019-01-01 RX ADMIN — IODIXANOL 75 ML: 320 INJECTION, SOLUTION INTRAVASCULAR at 02:07

## 2019-01-01 RX ADMIN — HYDROCORTISONE SODIUM SUCCINATE 100 MG: 100 INJECTION, POWDER, FOR SOLUTION INTRAMUSCULAR; INTRAVENOUS at 05:07

## 2019-01-01 RX ADMIN — NICARDIPINE HYDROCHLORIDE 2.5 MG/HR: 0.2 INJECTION, SOLUTION INTRAVENOUS at 03:07

## 2019-01-01 RX ADMIN — SODIUM CHLORIDE 250 ML: 0.9 INJECTION, SOLUTION INTRAVENOUS at 01:07

## 2019-01-01 RX ADMIN — FENTANYL CITRATE 25 MCG: 50 INJECTION INTRAMUSCULAR; INTRAVENOUS at 09:07

## 2019-01-01 RX ADMIN — NOREPINEPHRINE BITARTRATE 0.32 MCG/KG/MIN: 1 INJECTION, SOLUTION, CONCENTRATE INTRAVENOUS at 09:07

## 2019-01-01 RX ADMIN — CEFTRIAXONE SODIUM 1 G: 1 INJECTION, POWDER, FOR SOLUTION INTRAMUSCULAR; INTRAVENOUS at 11:07

## 2019-01-01 RX ADMIN — FENTANYL CITRATE 25 MCG: 50 INJECTION INTRAMUSCULAR; INTRAVENOUS at 01:07

## 2019-01-01 RX ADMIN — FENTANYL CITRATE 25 MCG: 50 INJECTION INTRAMUSCULAR; INTRAVENOUS at 06:07

## 2019-01-01 RX ADMIN — LIDOCAINE HYDROCHLORIDE: 10 INJECTION, SOLUTION EPIDURAL; INFILTRATION; INTRACAUDAL at 04:07

## 2019-01-01 RX ADMIN — CHLORHEXIDINE GLUCONATE 0.12% ORAL RINSE 15 ML: 1.2 LIQUID ORAL at 05:07

## 2019-07-03 PROBLEM — I63.412 CEREBRAL INFARCTION DUE TO EMBOLISM OF LEFT MIDDLE CEREBRAL ARTERY: Status: ACTIVE | Noted: 2019-01-01

## 2019-07-04 PROBLEM — R79.89 ELEVATED TROPONIN: Status: ACTIVE | Noted: 2019-01-01

## 2019-07-04 PROBLEM — I63.9 CEREBROVASCULAR ACCIDENT (CVA): Status: ACTIVE | Noted: 2019-01-01

## 2019-07-04 PROBLEM — J96.02 ACUTE RESPIRATORY FAILURE WITH HYPERCAPNIA: Status: ACTIVE | Noted: 2019-01-01

## 2019-07-04 PROBLEM — Z74.09 IMPAIRED MOBILITY AND ACTIVITIES OF DAILY LIVING: Status: ACTIVE | Noted: 2019-01-01

## 2019-07-04 PROBLEM — R13.10 DYSPHAGIA: Status: ACTIVE | Noted: 2019-01-01

## 2019-07-04 PROBLEM — Z78.9 IMPAIRED MOBILITY AND ACTIVITIES OF DAILY LIVING: Status: ACTIVE | Noted: 2019-01-01

## 2019-07-04 PROBLEM — G93.6 CYTOTOXIC BRAIN EDEMA: Status: ACTIVE | Noted: 2019-01-01

## 2019-07-04 NOTE — PROCEDURES
"Tevin Cristobal is a 73 y.o. male patient.    Pulse: (!) 115 (07/04/19 0240)  Resp: 16 (07/04/19 0215)  BP: (!) 176/87 (07/04/19 0240)  SpO2: 100 % (07/04/19 0240)  Weight: 65.3 kg (144 lb) (07/03/19 2329)  Height: 5' 7" (170.2 cm) (07/03/19 2329)       Arterial Line  Date/Time: 7/4/2019 2:56 AM  Performed by: Nickie Daniels PA-C  Authorized by: Nickie Daniels PA-C   Assisting provider: Bebeto Landeros NP  Consent Done: Emergent Situation  Preparation: Patient was prepped and draped in the usual sterile fashion.  Indications: multiple ABGs and hemodynamic monitoring  Location: right radial  Patient sedated: no  Rikki's test normal: yes  Needle gauge: 20  Number of attempts: 1  Complications: No  Estimated blood loss (mL): 2  Post-procedure: dressing applied  Post-procedure CMS: unchanged  Patient tolerance: Patient tolerated the procedure well with no immediate complications      Bebeto Landeros NP was present for the entire procedure    Nickie Daniels PA-C  7/4/2019  "

## 2019-07-04 NOTE — PROCEDURES
Radiology Post-Procedure Note    Pre Op Diagnosis: Left MCA stroke    Post Op Diagnosis: same    Procedure: Cerebral angiogram    Procedure performed by: Jarrett Arevalo MD    Written Informed Consent Obtained: Yes phone consent obtained from daughter     Specimen Removed: YES thrombus from the left MCA    Estimated Blood Loss: less than 50     Procedure report:     8f sheath was placed into the right femoral artery and a 5F Fito catheter was advanced into the aortic arch.  The RCCA, LICA, and left vertebral arteries were subselected and angiography of the brain was performed after injection into each of these vessels.    Left MCA thrombectomy was performed with aspiratin thrombectomy using 2 passes with TICI 3 reperfusion.  Patient also has right M1 occlusion that was considered to be chronic since his presenting symptoms were in the left MCA.   Please see Imaging report for full details.    A right femoral artery angiogram was performed, the sheath removed and hemostasis achieved using Angioseal.  No hematoma was present at the time of hemostasis.    The patient tolerated the procedure well.     Groin puncture was at 1:44 AM on 7/4/19.    1st Pass was at 1:56 with TICI 2B reperfusion.    2nd pass was at 1:58 with TICI 3 reperfusion.      Jarrett Arevalo MD  Attending Radiologist  Interventional Neuroradiology

## 2019-07-04 NOTE — CONSULTS
[facility]  Vascular Neurology  Comprehensive Stroke Center  Tele-Consultation Note      Consults    Consulting Provider: MARISEL JUAN  Current Providers  No providers found    Patient Location: [facility] TELEMEDICINE ED [facility] TRANSFER CENTER Emergency Department  Spoke hospital nurse at bedside with patient assisting consultant.     Patient information was obtained from patient and relative(s).         Assessment/Plan:     STROKE DOCUMENTATION     Acute Stroke Times:   Acute Stroke Times   Last Known Normal Date: 07/03/19  Last Known Normal Time: 1830  Stroke Team Arrival Date: 07/03/19  Stroke Team Arrival Time: 1924  CT Interpretation Time: 1932  Decision to Treat Time for Alteplase: 1940    NIH Scale:  1a. Level of Consciousness: 0-->Alert, keenly responsive  1b. LOC Questions: 2-->Answers neither question correctly  1c. LOC Commands: 2-->Performs neither task correctly  2. Best Gaze: 1-->Partial gaze palsy, gaze is abnormal in one or both eyes, but forced deviation or total gaze paresis is not present  3. Visual: 1-->Partial hemianopia  4. Facial Palsy: 2-->Partial paralysis (total or near-total paralysis of lower face)  5a. Motor Arm, Left: 0-->No drift, limb holds 90 (or 45) degrees for full 10 secs  5b. Motor Arm, Right: 4-->No movement  6a. Motor Leg, Left: 0-->No drift, leg holds 30 degree position for full 5 secs  6b. Motor Leg, Right: 4-->No movement  7. Limb Ataxia: 0-->Absent  8. Sensory: 1-->Mild-to-moderate sensory loss, patient feels pinprick is less sharp or is dull on the affected side, or there is a loss of superficial pain with pinprick, but patient is aware of being touched  9. Best Language: 3-->Mute, global aphasia, no usable speech or auditory comprehension  10. Dysarthria: 2-->Severe dysarthria, patients speech is so slurred as to be unintelligible in the absence of or out of proportion to any dysphasia, or is mute/anarthric  11.  Extinction and Inattention (formerly Neglect): 1-->Visual, tactile, auditory, spatial, or personal inattention or extinction to bilateral simultaneous stimulation in one of the sensory modalities  Total (NIH Stroke Scale): 23     Modified [place]    [place] Coma Scale:    ABCD2 Score:    [id number] Score:   HAS -BLED Score:   ICH Score:   Hunt & Kaye Classification:       Diagnoses:   Cerebral infarction due to embolism of left middle cerebral artery  SEE HPI    LMCA stroke. CT head normal. Limited medical history, but no clear contraindications for stroke  TPA recommended  Transfer and thrombectomy evaluation.    Antithrombotics for secondary stroke prevention: Antiplatelets: None: Hold all Antithrombotics x 24 hours after IV t-PA administration    Statins for secondary stroke prevention and hyperlipidemia, if present:   Statins: Atorvastatin- 80 mg daily    Aggressive risk factor modification: CAD     Rehab efforts: The patient has been evaluated by a stroke team provider and the therapy needs have been fully considered based off the presenting complaints and exam findings. The following therapy evaluations are needed: PT evaluate and treat, OT evaluate and treat, SLP evaluate and treat, PM&R evaluate for appropriate placement    Diagnostics ordered/pending: CTA Head to assess vasculature , CTA Neck/Arch to assess vasculature, HgbA1C to assess blood glucose levels, Lipid Profile to assess cholesterol levels, MRA head to assess vasculature, MRA neck/arch to assess vasculature, MRI head without contrast to assess brain parenchyma, TTE to assess cardiac function/status , Other: LINQ device    VTE prophylaxis: None: Reason for No Pharmacological VTE Prophylaxis: Mechanical prophylaxis: Place SCDs    BP parameters: Infarct: Post tPA, SBP <180            There were no vitals taken for this visit.  Alteplase Eligible?: Yes  Alteplase Recommendation:   Alteplase Total Dose:   Total dose: Alteplase 0.9mg/kg (max  dose:90mg)                      ** based on acquired weight from facility   Bolus Dose:   10% of total Alteplase dose given intravenously over 1 minute   Continuous Infusion Dose:   Remaining 90% of total Alteplase dose infused intravenously over 60 minutes    **infusion must start at the same time as the bolus dose     Additional Recommendations:   1. Neurological assessment and vital signs (except temperature) every 15 minutes during Altaplase infusion.  2. Frequency of BP assessments may need to be increased if systolic BP stays >= 180 mm Hg or diastolic BP stays >= 105 mm Hg. Administer antihypertensive meds as ordered  3. Continue to monitor and control blood pressure and monitor for neurological deterioration every 15 minutes for the first hour after the infusion is stopped. Then every 30 minutes for the next 6 hours. Perform hourly monitoring from the 8th post-infusion hour until 24 hours post-infusion.  4. Temperature every 4 hours or as required.  5. Follow hospital protocol for further orders re: post tPA infusion patient management.  6. No antithrombotics or anticoagulants (including but not limited to: heparin, warfarin, aspirin, clopidigrel, or dipyridamole) for 24 hours, then start antithrombotics as ordered by treating physician    Adapted from the American Heart Association/American Stroke Association (AHA/ASA) and American Association of Neuroscience Nurses (AANN) Guidelines.   Possible Interventional Revascularization Candidate? Yes    Disposition Recommendation: transfer to [facility] by  air  stat    Subjective:     History of Present Illness:  73 year old. CHF/CAD, ex-smoker. Non compliant with meds. Recently moved to [facility]- incomplete medical history  LSN at 1830. Witnessed onset of L MCA syndrome  CT head normal. IV TPA recommended. Will transfer ED to ED and get CTA head/neck and thrombectomy if appropriate      Woke up with symptoms?: no    Recent bleeding noted: no  Does the  patient take any Blood Thinners? yes  Medications: Antiplatelets:  aspirin      Past Medical History: MI/CAD and CHF    Past Surgical History: no relevant surgical history and no major surgeries within the last 2 weeks    Family History: Heart Problems    Social History: former smoker    Allergies: Allergies have not been reviewed No relevant allergies    Review of Systems   Unable to perform ROS: Patient nonverbal     Objective:        CT READ: Yes  No hemmorhage. No mass effect. No early infarct signs.     Physical Exam   Constitutional: He appears distressed.   HENT:   Head: Normocephalic.   Eyes: Pupils are equal, round, and reactive to light.   Neck: Normal range of motion.   Cardiovascular: Normal rate.   Abdominal: Soft.   Neurological: A cranial nerve deficit and sensory deficit is present. He exhibits abnormal muscle tone. Coordination abnormal.   Skin: Skin is warm.             Recommended the emergency room physician to have a brief discussion with the patient and/or family if available regarding the risks and benefits of treatment, and to briefly document the occurrence of that discussion in his clinical encounter note.     The attending portion of this evaluation, treatment, and documentation was performed per Huan Ortiz MD via audiovisual.    Billing code:  (time dependent stroke, complex case, unstable patient, hemorrhages, any intervention, some mimics)    · This patient has a very critical neurological condition/illness, with very high morbidity and mortality.  · There is a very high probability for acute neurological change leading to clinical and possibly life-threatening deterioration requiring highest level of physician preparedness for urgent intervention.  · There is possibility that this condition will require treatment with high risk medications as quickly as possible.  · There is also a possibility that the patient may benefit from further, more advance complex therapies (e.g.  endovascular therapy) that will require prompt diagnosis and care.  · Care was coordinated with other physicians involved in the patient's care.  · Radiologic studies and laboratory data were reviewed and interpreted, and plan of care was re-assessed based on the results.  · Diagnosis, treatment options and prognosis may have been discussed with the patient and/or family members or caregiver.  · Further advanced medical management and further evaluation is warranted for his care.      In your opinion, this was a: Tier 1 Van Positive    Consult End Time: 7:44 PM     Huan Ortiz MD  [facility]  Vascular Neurology   [facility]

## 2019-07-04 NOTE — CONSULTS
[facility]  Vascular Neurology  Comprehensive Stroke Center  Consult Note    Inpatient consult to Vascular (Stroke) Neurology  Consult performed by: Lianne Ferrell MD  Consult ordered by: Bebeto Landeros NP        Assessment/Plan:     Patient is a 73 y.o. year old male with:    Cerebrovascular accident (CVA)  Patient is a 74 yo male with PMHx of CAD and CHF p/w LMCA syndrome started one hour prior to tele-stroke, received tPA and transferred to [facility] for ICU evaluation. Patient got agitated on the way to [facility] which received Versed, upon arrival patient in respiratory distress, s/p intubation, CTA  revealed left carotid terminus occlusion.  - plans for endovascular thrombectomy by IR team  - NCC team aware     Antithrombotics for secondary stroke prevention: Antiplatelets: None: Hold all Antithrombotics x 24 hours after IV t-PA administration    Statins for secondary stroke prevention and hyperlipidemia, if present:   Statins: Atorvastatin- 40 mg daily    Aggressive risk factor modification: CAD     Rehab efforts: The patient has been evaluated by a stroke team provider and the therapy needs have been fully considered based off the presenting complaints and exam findings. The following therapy evaluations are needed: PT evaluate and treat, OT evaluate and treat, SLP evaluate and treat    Diagnostics ordered/pending: TTE to assess cardiac function/status     VTE prophylaxis: None: Reason for No Pharmacological VTE Prophylaxis: Mechanical prophylaxis: Place SCDs    BP parameters: Infarct: Post sucessful thrombectomy, SBP <140            STROKE DOCUMENTATION     Acute Stroke Times   Last Known Normal Date: 07/03/19  Last Known Normal Time: 1830  Symptom Onset Date: 07/03/19  Stroke Team Arrival Date: 07/03/19  Stroke Team Arrival Time: 1924  CT Interpretation Time: 1932  Decision to Treat Time for Alteplase: 1940  Decision to Treat Time for IR: 0032    NIH Scale: prior to tPA  1a. Level of  Consciousness: 0-->Alert, keenly responsive  1b. LOC Questions: 2-->Answers neither question correctly  1c. LOC Commands: 2-->Performs neither task correctly  2. Best Gaze: 1-->Partial gaze palsy, gaze is abnormal in one or both eyes, but forced deviation or total gaze paresis is not present  3. Visual: 1-->Partial hemianopia  4. Facial Palsy: 2-->Partial paralysis (total or near-total paralysis of lower face)  5a. Motor Arm, Left: 0-->No drift, limb holds 90 (or 45) degrees for full 10 secs  5b. Motor Arm, Right: 4-->No movement  6a. Motor Leg, Left: 0-->No drift, leg holds 30 degree position for full 5 secs  6b. Motor Leg, Right: 4-->No movement  7. Limb Ataxia: 0-->Absent  8. Sensory: 1-->Mild-to-moderate sensory loss, patient feels pinprick is less sharp or is dull on the affected side, or there is a loss of superficial pain with pinprick, but patient is aware of being touched  9. Best Language: 3-->Mute, global aphasia, no usable speech or auditory comprehension  10. Dysarthria: 2-->Severe dysarthria, patients speech is so slurred as to be unintelligible in the absence of or out of proportion to any dysphasia, or is mute/anarthric  11. Extinction and Inattention (formerly Neglect): 1-->Visual, tactile, auditory, spatial, or personal inattention or extinction to bilateral simultaneous stimulation in one of the sensory modalities  Total (NIH Stroke Scale): 23      NIH Scale:  1a. Level of Consciousness: 3  1b. LOC Questions: 2-->Answers neither question correctly  1c. LOC Commands: 2-->Performs neither task correctly  2. Best Gaze: 1-->Partial gaze palsy, gaze is abnormal in one or both eyes, but forced deviation or total gaze paresis is not present  3. Visual: 1-->Partial hemianopia  4. Facial Palsy: 2-->Partial paralysis (total or near-total paralysis of lower face)  5a. Motor Arm, Left:4-->No movement (patient sedated)  5b. Motor Arm, Right: 4-->No movement (patient sedated)  6a. Motor Leg, Left: 4-->No  movement (patient sedated)  6b. Motor Leg, Right: 4-->No movement (patient sedated)  7. Limb Ataxia: UN-->(patient sedated)  8. Sensory: 2  9. Best Language: 3-->Mute, global aphasia, no usable speech or auditory comprehension  10. Dysarthria: UN  11. Extinction and Inattention (formerly Neglect): 2  Total (NIH Stroke Scale): 32      Modified Benny  5      Thrombolysis Candidate? Yes, given prior to arrival at outside hospital    Delays to Thrombolysis?  No    Interventional Revascularization Candidate?   Is the patient eligible for mechanical endovascular reperfusion ([place])?  Yes      Hemorrhagic change of an Ischemic Stroke: Does this patient have an ischemic stroke with hemorrhagic changes? No     Subjective:     History of Present Illness:  Mr Tevin Cristobal is a 74 yo male with PMHx of CAD, CHF transferred to [facility] from [facility] post-tPA. Patient's had LMCA syndrome, LKN 7/3 at 18:30, NIHSS 23.  Patient had episode of agitation while getting transferred and received versed. Upon arrival at [facility] patient found to have cheyne-mejia respiration and not responsive, unable to do NIHSS. Patient underwent intubation which tolerated well. Patient underwent CTA which revealed left carotid occlusion. Patient was taken to IR thrombectomy.          No past medical history on file.  No past surgical history on file.  No family history on file.  Social History     Tobacco Use    Smoking status: Not on file   Substance Use Topics    Alcohol use: Not on file    Drug use: Not on file     Review of patient's allergies indicates:  No Known Allergies    Medications: I have reviewed the current medication administration record.      (Not in a hospital admission)    Review of Systems   Unable to perform ROS: Intubated     Objective:     Vital Signs (Most Recent):  Pulse: 110 (07/04/19 0055)  Resp: 16 (07/04/19 0055)  BP: (!) 174/90 (07/04/19 0055)  SpO2: 100 % (07/04/19 0055)    Vital Signs Range (Last  24H):  Temp:  [97.8 °F (36.6 °C)]   Pulse:  []   Resp:  [16-35]   BP: (144-176)/(82-97)   SpO2:  [98 %-100 %]     Physical Exam   Constitutional: He appears distressed.   Cardiovascular: Normal rate.   Pulmonary/Chest: He is in respiratory distress.   Abdominal: He exhibits no distension.   Skin: Capillary refill takes 2 to 3 seconds. He is not diaphoretic.       Neurological Exam:   LOC: obtunded  Tone: Normal tone throughout      Laboratory:  CMP: No results for input(s): GLUCOSE, CALCIUM, ALBUMIN, PROT, NA, K, CO2, CL, BUN, CREATININE, ALKPHOS, ALT, AST, BILITOT in the last 24 hours.  Lipid Panel:   Recent Labs   Lab 07/04/19  0016   CHOL 170   LDLCALC 115.2   HDL 34*   TRIG 104     Hgb A1C:   Recent Labs   Lab 07/04/19  0016   HGBA1C 5.2     TSH:   Recent Labs   Lab 07/04/19  0016   TSH 1.778       Diagnostic Results:      Brain imaging:      Vessel Imaging:  CTA   Pending read, preliminary read, L ICA occlusion    Cardiac Evaluation:   none      Lianne Ferrell MD  Comprehensive Stroke Center  Department of Vascular Neurology   [facility]

## 2019-07-04 NOTE — SEDATION DOCUMENTATION
Cerebral angiogram complete. Pt tolerated well. VSS. No signs or symptoms of distress noted.Angioseal closure device in place. Hemostasis 0214. Pt to remain flat [id number] Pt will be transferred to Neuro ICU via bed accompanied by resp. therapy and Neuro ICU RN Suly.

## 2019-07-04 NOTE — ED PROVIDER NOTES
Encounter Date: 7/3/2019       History     Chief Complaint   Patient presents with    TPA Transfer     transfer from [facility] at 1830 while eating, pt began drooling and developed R sided facial droop. TPA bolus of 5.9 mg at 1955, infusion of 53.1 mg at 1955. EMS reports GCS of 10. EMS reports pt becoming agitated en route, given 10 mg versed en route, 100 mcg fentanyl, and 1 mg ativan     Pt is a 74 yo male with unknown prior medical history who was transferred from [place] after CVA diagnosis and TPA administration. Pt is nonverbal and history was obtained from EMS and other medical staff. His last known normal was 18:30 this evening during dinner. He became aphasic and slumped over in his chair, and was brought by family to his local ED. There he had full aphasia and right-sided face, arm and leg weakness. He was verbally unresponsive with a GCS of 10. He was given tPA at approximately 20:00 and it was finished at approximately 21:00. Labs there also showed that his BNP was 30,000, troponin was 0.2, creatinine was 1.7. He was then sent here by helicopter. While he was being loaded into the helicopter he became restless and pulling out his monitor leads and IVs, and was given 100 mcg fentanyl and 1 mg ativan. Throughout the flight he continued to be restless and was given a total of 10 mg versed. As he arrived to our ED he was taken straight to imaging for a CTA. While he was being prepared for imaging, he began snoring, coughing, and had agonal breathing, with a GCS score of 6. We decided to delay the CTA and intubate the patient. He was then taken to CT.        Review of patient's allergies indicates:  No Known Allergies  No past medical history on file.  No past surgical history on file.  No family history on file.  Social History     Tobacco Use    Smoking status: Not on file   Substance Use Topics    Alcohol use: Not on file    Drug use: Not on file     Review of Systems   Unable to perform  ROS: Mental status change       Physical Exam     Initial Vitals [07/03/19 2329]   BP Pulse Resp Temp SpO2   (!) 144/82 (!) 118 20 -- 98 %      MAP       --         Physical Exam    Constitutional: He appears well-developed. He appears distressed.   HENT:   Head: Normocephalic and atraumatic.   Eyes: EOM are normal.   Neck: No tracheal deviation present.   Cardiovascular: Normal rate.   Pulmonary/Chest: Breath sounds normal.   intubated   Abdominal: Soft. He exhibits no distension.   Musculoskeletal: He exhibits no edema.   Neurological: He is unresponsive. GCS eye subscore is 2. GCS verbal subscore is 1. GCS motor subscore is 3.   Skin: Skin is warm and dry. No erythema.         ED Course   Intubation  Date/Time: 7/4/2019 1:21 AM  Performed by: Mukesh Bernal DO  Authorized by: Jose Martínez MD   Indications: respiratory distress and  airway protection  Intubation method: video-assisted  Patient status: paralyzed (RSI)  Preoxygenation: BVM  Sedatives: etomidate  Paralytic: succinylcholine  Laryngoscope size: Mac 4  Tube size: 7.5 mm  Tube type: cuffed  Number of attempts: 1  Cords visualized: yes  Post-procedure assessment: chest rise and CO2 detector  Breath sounds: rales/crackles  Cuff inflated: yes  ETT to lip: 25 cm  Tube secured with: ETT hernandez  Chest x-ray interpreted by me.  Chest x-ray findings: endotracheal tube in appropriate position  Patient tolerance: Patient tolerated the procedure well with no immediate complications  Complications: No        Labs Reviewed   HEMOGLOBIN A1C   LIPID PANEL   TSH   URINALYSIS, REFLEX TO URINE CULTURE   TYPE & SCREEN   POCT GLUCOSE MONITORING CONTINUOUS          Imaging Results          X-Ray Chest AP Portable (In process)                CTA STROKE MULTI-PHASE (In process)  Result time 07/04/19 00:46:53                 Medical Decision Making:   History:   I obtained history from: EMS provider.       <> Summary of History: Pt is a 74 yo male with unknown prior  medical history who suffered a CVA and received tPA prior to arrival.  Initial Assessment:   Differential diagnosis includes but is not limited to CVA, MI, CHF exacerbation, SOPHIA. We were informed by EMS that BNP and troponins were elevated at the local ED, which may be from hypoxia from the CVA but also could signify concomitant CHF exacerbation. We will order CXR to confirm ET tube placement which will also evaluate for signs of CHF exacerbation.  ED Management:  2:26 AM - CXR is resulted and ET tube is in the correct place.                      Clinical Impression:       ICD-10-CM ICD-9-CM   1. Cerebrovascular accident (CVA), unspecified mechanism I63.9 434.91   2. Encounter for central line placement Z45.2 V58.81                                Mukesh Bernal DO  Resident  07/04/19 0241

## 2019-07-04 NOTE — H&P
Inpatient Radiology Pre-procedure Note    History of Present Illness:  Tevin Cristobal is a 73 y.o. male who presents for cerebral angiogram and possible thrombectomy of the lieft ICA and MCA.  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  He had respiratory decline in route and is now intubated.   .  Admission H&P reviewed.  No past medical history on file.  No past surgical history on file.    Review of Systems:   As documented in primary team H&P    Home Meds:   Prior to Admission medications    Not on File     Scheduled Meds:    atorvastatin  40 mg Oral Daily     Continuous Infusions:    dexmedetomidine (PRECEDEX) infusion 0.5 mcg/kg/hr (07/03/19 4158)     PRN Meds:sodium chloride 0.9%  Anticoagulants/Antiplatelets: tPA today     Allergies: Review of patient's allergies indicates:  No Known Allergies  Sedation Hx: have not been any systemic reactions    Labs:  No results for input(s): INR in the last 168 hours.    Invalid input(s):  PT,  PTT  No results for input(s): WBC, HGB, HCT, MCV, PLT in the last 168 hours. No results for input(s): GLU, NA, K, CL, CO2, BUN, CREATININE, CALCIUM, MG, ALT, AST, ALBUMIN, BILITOT, BILIDIR in the last 168 hours.      Vitals:  Pulse: 109 (07/04/19 0025)  Resp: (!) 35 (07/04/19 0025)  BP: (!) 176/90 (07/04/19 0025)  SpO2: 100 % (07/04/19 0025)     Physical Exam:  ASA: 3  Mallampati: intubated  See neuro and ED notes for exam findings.    NIHSS 24    Plan: cerebral angiogram andstroke treatment  Sedation Plan: intubated    Jarrett Arevalo MD  Attending Radiologist  Interventional Neuroradiology

## 2019-07-04 NOTE — ED TRIAGE NOTES
Pt arrives as stroke transfer from [facility] at 1830. Pt was eating dinner and began drooling, then had L sided facial droop, L gaze and R side flaccid. Pt was given TPA, bolus of 5.9 mg at 1955, infusion of 53.1 mg at 1955. Pt also diagnosed with NSTEMI at facility. En route, pt was given 10 mg total of versed, last dose was 2300 of 5mg. Pt arrives with snoring respirations, prior to CT scan, pt needed to be intubated.    Patient Identifiers for Tevin Cristobal checked and correct  LOC: Pt lethargic with snoring respirations on arrival  APPEARANCE: Patient resting comfortably and in no acute distress, patient is clean and well groomed, patient's clothing is properly fastened.  SKIN: The skin is warm and dry, patient has normal skin turgor and moist mucus membranes,no rashes or lesions.Skin Intact , No Breakdown Noted  Musculoskeletal :  Normal range of motion noted. Moves all extremeties well, No swelling or tenderness noted  RESPIRATORY: Airway is open and patent, respirations are spontaneous, patient has a normal effort and rate.  CARDIAC: Patient has a normal rate and rhythm, no periphreal edema noted, capillary refill < 3 seconds.   ABDOMEN: Soft and non tender to palpation, no distention noted.   PULSES: 2+  And symmetrical in all extremeties  NEUROLOGIC: See neuro flowsheet  Will continue to monitor

## 2019-07-04 NOTE — HPI
Mr Cristobal  is a 74 yo male with unknown prior medical history who presents to [facility] s/p tPA. LKN was at 1830 he began to drool, developed R sided weakness and facial droop. He was given tPA @ 1955. HE became agitated en route and was given multiple doses of versed. He was intubated upon arrival in the ED due to lethargy and inability to protect  his airway. CTA showed a L ICA terminus occlusion. He was take to IR. He is being admitted to [facility] for a higher level of care.

## 2019-07-04 NOTE — ASSESSMENT & PLAN NOTE
S/p TPA @ 1955  - VN following   - IR for possible thrombectomy of the lieft ICA and MCA  - CTA: L ICA terminus occlusion   - SBP <140   - Q 1 vitals   - Q 1 neuro checks   - Aspirin pending pot tpa imaging   - atorvastatin daily  -TSH, lipid, A1c  - PT/OT/SP eval and treat  - NPO

## 2019-07-04 NOTE — PROGRESS NOTES
Patient arrived to [facility] room 9072 from ED>>IR>> [facility]    Type of stroke/diagnosis: Left MCA s/p TPA and thrombectomy    TPA start 1955 and end time 2055     Thrombectomy start 0136 and end time 0214    Current symptoms: extensor posturing, not following commands, responds to pain    Skin assessment done: Y  Wounds noted: none    NCC notified: Bebeto Landeros NP

## 2019-07-04 NOTE — PROGRESS NOTES
Patient transported from ED 2 to IR via transport ventilator with oxygen attached per RT. Iris Ambu bag and mask at [facility].

## 2019-07-04 NOTE — ASSESSMENT & PLAN NOTE
Patient is a 74 yo male with PMHx of CAD and CHF p/w LMCA syndrome started one hour prior to tele-stroke, received tPA and transferred to [facility] for ICU evaluation. Patient got agitated on the way to [facility] which received Versed, upon arrival patient in respiratory distress, s/p intubation, CTA  revealed left carotid terminus occlusion.  - plans for endovascular thrombectomy by IR team  - NCC team aware     Antithrombotics for secondary stroke prevention: Antiplatelets: None: Hold all Antithrombotics x 24 hours after IV t-PA administration    Statins for secondary stroke prevention and hyperlipidemia, if present:   Statins: Atorvastatin- 40 mg daily    Aggressive risk factor modification: CAD     Rehab efforts: The patient has been evaluated by a stroke team provider and the therapy needs have been fully considered based off the presenting complaints and exam findings. The following therapy evaluations are needed: PT evaluate and treat, OT evaluate and treat, SLP evaluate and treat    Diagnostics ordered/pending: TTE to assess cardiac function/status     VTE prophylaxis: None: Reason for No Pharmacological VTE Prophylaxis: Mechanical prophylaxis: Place SCDs    BP parameters: Infarct: Post sucessful thrombectomy, SBP <140

## 2019-07-04 NOTE — SUBJECTIVE & OBJECTIVE
Woke up with symptoms?: no    Recent bleeding noted: no  Does the patient take any Blood Thinners? yes  Medications: Antiplatelets:  aspirin      Past Medical History: MI/CAD and CHF    Past Surgical History: no relevant surgical history and no major surgeries within the last 2 weeks    Family History: Heart Problems    Social History: former smoker    Allergies: Allergies have not been reviewed No relevant allergies    Review of Systems   Unable to perform ROS: Patient nonverbal     Objective:        CT READ: Yes  No hemmorhage. No mass effect. No early infarct signs.     Physical Exam   Constitutional: He appears distressed.   HENT:   Head: Normocephalic.   Eyes: Pupils are equal, round, and reactive to light.   Neck: Normal range of motion.   Cardiovascular: Normal rate.   Abdominal: Soft.   Neurological: A cranial nerve deficit and sensory deficit is present. He exhibits abnormal muscle tone. Coordination abnormal.   Skin: Skin is warm.

## 2019-07-04 NOTE — HOSPITAL COURSE
7/4:  Admit NCC , CTA, IR   7/8: Neurological exam is compatible with brain death. Will plan an apnea test after correcting confounding metabolic issues   7/9: Plans for apnea testing today

## 2019-07-04 NOTE — PROCEDURES
"Tevin Cristobal is a 73 y.o. male patient.    Temp: 100.1 °F (37.8 °C) (07/04/19 1105)  Pulse: 81 (07/04/19 1316)  Resp: (!) 28 (07/04/19 1316)  BP: 128/71 (07/04/19 1314)  SpO2: 98 % (07/04/19 1316)  Weight: 60 kg (132 lb 4.4 oz) (07/04/19 0353)  Height: 6' (182.9 cm) (07/04/19 0301)       Central Line  Date/Time: 7/4/2019 2:13 PM  Location procedure was performed: [facility] NEURO CRITICAL CARE  Performed by: Liat Dumont MD  Consent Done: Emergent Situation  Time out: Immediately prior to procedure a "time out" was called to verify the correct patient, procedure, equipment, support staff and site/side marked as required.  Indications: med administration, hemodynamic monitoring and vascular access  Anesthesia: local infiltration  Preparation: skin prepped with ChloraPrep  Skin prep agent dried: skin prep agent completely dried prior to procedure  Sterile barriers: all five maximum sterile barriers used - cap, mask, sterile gown, sterile gloves, and large sterile sheet  Hand hygiene: hand hygiene performed prior to central venous catheter insertion  Location details: right subclavian  Catheter type: triple lumen  Ultrasound guidance: no  Manometry: Yes  Number of attempts: 1  Assessment: placement verified by x-ray,  no pneumothorax on x-ray and successful placement  Complications: none  Post-procedure: line sutured,  chlorhexidine patch,  sterile dressing applied and blood return through all ports  Comments: Attempted L subclavian, accesesed vein 3x with difficulty advancing wire. Pressure held and procedure aborted. Successful placement of R subclavian cvc as above. Bilateral lung US performed post procedure with demonstrated lung sliding in apical and anterior lung fields.           Liat Dumont  7/4/2019  "

## 2019-07-04 NOTE — SEDATION DOCUMENTATION
Pt arrived to room 190 for cerebral angiogram and possible  thombectomy . Pt intubated accompanied by ER RN and resp. Therepist.  Dr Arevalo here.  Patient on Presadex gtt for sedation and being titrated per ER/NICU RN. Patient posturing

## 2019-07-04 NOTE — HPI
73 year old. CHF/CAD, ex-smoker. Non compliant with meds. Recently moved to region- incomplete medical history  LSN at 1830. Witnessed onset of L MCA syndrome  CT head normal. IV TPA recommended. Will transfer ED to ED and get CTA head/neck and thrombectomy if appropriate

## 2019-07-04 NOTE — PLAN OF CARE
Problem: Adult Inpatient Plan of Care  Goal: Plan of Care Review  Outcome: Ongoing (interventions implemented as appropriate)  Nutrition assessment completed. Please see RD note for details.    Recommendation/Intervention:   1. If unable to extubate <72hrs, recommend starting TF.   Impact Peptide @ goal rate 45mL/hr.   - Initiate @ 15mL/hr and increase by 10mL q4hrs, or as tolerated, until goal rate is reached.   - Hold for residuals >500mL.   - Provides 1620kcals, 101g protein and 832mL free water.     2. If able to extubate and advance diet, recommend Cardiac with texture per SLP recommendations.     RD to monitor.    Goals: Pt to receive nutrition by RD follow up  Nutrition Goal Status: new  Communication of RD Recs: reviewed with RN

## 2019-07-04 NOTE — SUBJECTIVE & OBJECTIVE
No past medical history on file.  No past surgical history on file.   No current facility-administered medications on file prior to encounter.      No current outpatient medications on file prior to encounter.      Allergies: Patient has no known allergies.    No family history on file.  Social History     Tobacco Use    Smoking status: Not on file   Substance Use Topics    Alcohol use: Not on file    Drug use: Not on file     Review of Systems  Objective:     Unable to perform due to LOC      Vitals:    Pulse: 110  BP: (!) 174/90  MAP (mmHg): 119  Resp: 16  SpO2: 100 %  Oxygen Concentration (%): 50  O2 Device (Oxygen Therapy): ventilator  Vent Mode: A/C  Set Rate: 16 bmp  Vt Set: 400 mL  Pressure Support: 0 cmH20  PEEP/CPAP: 5 cmH20  Peak Airway Pressure: 19 cmH2O  Mean Airway Pressure: 9.2 cmH20  Plateau Pressure: 0 cmH20    Temp  Min: 97.8 °F (36.6 °C)  Max: 97.8 °F (36.6 °C)  Pulse  Min: 90  Max: 118  BP  Min: 144/82  Max: 176/90  MAP (mmHg)  Min: 119  Max: 119  Resp  Min: 16  Max: 35  SpO2  Min: 98 %  Max: 100 %  Oxygen Concentration (%)  Min: 50  Max: 50    07/03 0701 - 07/04 0700  In: -   Out: 200 [Urine:200]           Physical Exam      Physical Exam:  GA: sedated, comfortable, no acute distress.   HEENT: No scleral icterus or JVD.   Pulmonary: Clear to auscultation A//L.   Cardiac: RRR S1 & S2 w/o rubs/murmurs/gallops.   Abdominal: Bowel sounds present x 4.   Skin: No jaundice, rashes, or visible lesions.  Neuro:  --GCS: E1 V1T M4 recent intubation   --Mental Status:  Sedated and intubated   --Pupils 3mm, PERRL.   --Corneal reflex, gag, cough intact.  --Withdraws to pain on L   No movement to R upper or lower     Unable to test orientation, language, memory, judgment, insight, fund of knowledge, hearing, shoulder shrug, tongue protrusion, coordination, gait due to level of consciousness.    Today I personally reviewed pertinent medications, lines/drains/airways, imaging, laboratory results,

## 2019-07-04 NOTE — PROGRESS NOTES
Pharmacist Renal Dose Adjustment Note    Tevin Cristobal is a 73 y.o. male being treated with the medication famotidine.     Patient Data:    Vital Signs (Most Recent):  Temp: 99.1 °F (37.3 °C) (07/04/19 0700)  Pulse: 84 (07/04/19 0940)  Resp: (!) 34 (07/04/19 0940)  BP: 114/65 (07/04/19 0700)  SpO2: (no value, can't get spo2 readinng at this time) (07/04/19 0940)   Vital Signs (72h Range):  Temp:  [97.8 °F (36.6 °C)-99.1 °F (37.3 °C)]   Pulse:  []   Resp:  [16-35]   BP: (114-180)/()   SpO2:  [91 %-100 %]   Arterial Line BP: (117-140)/(47-55)      Recent Labs   Lab 07/04/19  0256   CREATININE 1.8*     Serum creatinine: 1.8 mg/dL (H) 07/04/19 0256  Estimated creatinine clearance: 31.5 mL/min (A)    Medication: famotidine 20 mg BID will be changed to 20 mg daily.     Pharmacist's Name: Yu Villar, PharmD, BCCCP  Pharmacist's Extension: [phone]

## 2019-07-04 NOTE — SUBJECTIVE & OBJECTIVE
No past medical history on file.  No past surgical history on file.  No family history on file.  Social History     Tobacco Use    Smoking status: Not on file   Substance Use Topics    Alcohol use: Not on file    Drug use: Not on file     Review of patient's allergies indicates:  No Known Allergies    Medications: I have reviewed the current medication administration record.      (Not in a hospital admission)    Review of Systems   Unable to perform ROS: Intubated     Objective:     Vital Signs (Most Recent):  Pulse: 110 (07/04/19 0055)  Resp: 16 (07/04/19 0055)  BP: (!) 174/90 (07/04/19 0055)  SpO2: 100 % (07/04/19 0055)    Vital Signs Range (Last 24H):  Temp:  [97.8 °F (36.6 °C)]   Pulse:  []   Resp:  [16-35]   BP: (144-176)/(82-97)   SpO2:  [98 %-100 %]     Physical Exam   Constitutional: He appears distressed.   Cardiovascular: Normal rate.   Pulmonary/Chest: He is in respiratory distress.   Abdominal: He exhibits no distension.   Skin: Capillary refill takes 2 to 3 seconds. He is not diaphoretic.       Neurological Exam:   LOC: obtunded  Tone: Normal tone throughout      Laboratory:  CMP: No results for input(s): GLUCOSE, CALCIUM, ALBUMIN, PROT, NA, K, CO2, CL, BUN, CREATININE, ALKPHOS, ALT, AST, BILITOT in the last 24 hours.  Lipid Panel:   Recent Labs   Lab 07/04/19 0016   CHOL 170   LDLCALC 115.2   HDL 34*   TRIG 104     Hgb A1C:   Recent Labs   Lab 07/04/19 0016   HGBA1C 5.2     TSH:   Recent Labs   Lab 07/04/19 0016   TSH 1.778       Diagnostic Results:      Brain imaging:      Vessel Imaging:  CTA   Pending read, preliminary read, L ICA occlusion    Cardiac Evaluation:   none

## 2019-07-04 NOTE — PROGRESS NOTES
[id number] patient transported from ED 2 to CT scan via transport ventilator with oxygen attached.Ambu bag and mask at [facility]. Patient tolerated well no complications noted. 0025 patient returned to ED 2 and placed back on PB ventilator at documented parameters. RT to follow.

## 2019-07-04 NOTE — H&P
[facility]  Neurocritical Care  History & Physical    Admit Date: 7/3/2019  Service Date: 07/04/2019  Length of Stay: 0    Subjective:     Chief Complaint: Cerebral infarction due to embolism of left middle cerebral artery    History of Present Illness:        Mr Cristobal  is a 72 yo male with unknown prior medical history who presents to [facility] s/p tPA. LKN was at 1830 he began to drool, developed R sided weakness and facial droop. He was given tPA @ 1955. HE became agitated en route and was given multiple doses of versed. He was intubated upon arrival in the ED due to lethargy and inability to protect  his airway. CTA showed a L ICA terminus occlusion. He was take to IR. He is being admitted to [facility] for a higher level of care.       No past medical history on file.  No past surgical history on file.   No current facility-administered medications on file prior to encounter.      No current outpatient medications on file prior to encounter.      Allergies: Patient has no known allergies.    No family history on file.  Social History     Tobacco Use    Smoking status: Not on file   Substance Use Topics    Alcohol use: Not on file    Drug use: Not on file     Review of Systems  Objective:     Unable to perform due to LOC      Vitals:    Pulse: 110  BP: (!) 174/90  MAP (mmHg): 119  Resp: 16  SpO2: 100 %  Oxygen Concentration (%): 50  O2 Device (Oxygen Therapy): ventilator  Vent Mode: A/C  Set Rate: 16 bmp  Vt Set: 400 mL  Pressure Support: 0 cmH20  PEEP/CPAP: 5 cmH20  Peak Airway Pressure: 19 cmH2O  Mean Airway Pressure: 9.2 cmH20  Plateau Pressure: 0 cmH20    Temp  Min: 97.8 °F (36.6 °C)  Max: 97.8 °F (36.6 °C)  Pulse  Min: 90  Max: 118  BP  Min: 144/82  Max: 176/90  MAP (mmHg)  Min: 119  Max: 119  Resp  Min: 16  Max: 35  SpO2  Min: 98 %  Max: 100 %  Oxygen Concentration (%)  Min: 50  Max: 50    07/03 0701 - 07/04 0700  In: -   Out: 200 [Urine:200]           Physical Exam      Physical Exam:  GA:  sedated, comfortable, no acute distress.   HEENT: No scleral icterus or JVD.   Pulmonary: Clear to auscultation A//L.   Cardiac: RRR S1 & S2 w/o rubs/murmurs/gallops.   Abdominal: Bowel sounds present x 4.   Skin: No jaundice, rashes, or visible lesions.  Neuro:  --GCS: E1 V1T M4 recent intubation   --Mental Status:  Sedated and intubated   --Pupils 3mm, PERRL.   --Corneal reflex, gag, cough intact.  --Withdraws to pain on L   No movement to R upper or lower     Unable to test orientation, language, memory, judgment, insight, fund of knowledge, hearing, shoulder shrug, tongue protrusion, coordination, gait due to level of consciousness.    Today I personally reviewed pertinent medications, lines/drains/airways, imaging, laboratory results,         Assessment/Plan:     Neuro  * Cerebral infarction due to embolism of left middle cerebral artery  S/p TPA @ 1955  - VN following   - IR for possible thrombectomy of the lieft ICA and MCA  - CTA: L ICA terminus occlusion   - SBP <140   - Q 1 vitals   - Q 1 neuro checks   - Aspirin pending pot tpa imaging   - atorvastatin daily  -TSH, lipid, A1c  - PT/OT/SP eval and treat  - NPO        Cytotoxic brain edema  - see stroke     Pulmonary  Acute respiratory failure with hypercapnia  - Mechanical vent   - VAP   - CXR and ABG daily and PRN       GI  Dysphagia  - NPO  - SP eval and treat     Other  Impaired mobility and activities of daily living  - PT/Ot eval and treat           The patient is being Prophylaxed for:  Venous Thromboembolism with: Mechanical  Stress Ulcer with: H2B  Ventilator Pneumonia with: chlorhexidine oral care    Activity Orders          Diet NPO: NPO starting at 07/04 0008        Full Code       CCT 54 min     Bebeto Landeros NP  Neurocritical Care  [facility]

## 2019-07-04 NOTE — PLAN OF CARE
Problem: Adult Inpatient Plan of Care  Goal: Plan of Care Review  Outcome: Ongoing (interventions implemented as appropriate)  Plan of care reviewed with Mr. Tevin Cristobal at 0600. Patient intubated, so unable to determine patient's level of understanding. Questions and concerns to be addressed with family, when able. See previous notes for details regarding tonight's shift. Patient flat from 0214 to 0414 following thrombectomy - site without hematoma, swelling, or drainage. NS at 75 mL/hr; Cardene initiated and currently infusing at 2.5 mg/hr. Precedex off at this time. GCS 5 - JET notified accordingly. VS and assessments per flowsheets; will continue to monitor.

## 2019-07-04 NOTE — CONSULTS
[facility]  Adult Nutrition  Consult Note    SUMMARY     Recommendations    Recommendation/Intervention:   1. If unable to extubate <72hrs, recommend starting TF.   Impact Peptide @ goal rate 45mL/hr.   - Initiate @ 15mL/hr and increase by 10mL q4hrs, or as tolerated, until goal rate is reached.   - Hold for residuals >500mL.   - Provides 1620kcals, 101g protein and 832mL free water.     2. If able to extubate and advance diet, recommend Cardiac with texture per SLP recommendations.     RD to monitor.    Goals: Pt to receive nutrition by RD follow up  Nutrition Goal Status: new  Communication of RD Recs: reviewed with RN    Reason for Assessment    Reason For Assessment: consult  Diagnosis: stroke/CVA  Relevant Medical History: CAD, CHF  Interdisciplinary Rounds: attended  General Information Comments: Pt intubated. No family at bedside. LAY nutrition hx or perform NFPE as pt undergoing sterile procedure x 2 attempts. No TF ordered at this time.  Nutrition Discharge Planning: unable to determine at this time    Nutrition Risk Screen    Nutrition Risk Screen: dysphagia or difficulty swallowing    Nutrition/Diet History    Spiritual, Cultural Beliefs, Synagogue Practices, Values that Affect Care: other (see comments)(LAY at this time; patient intubated; will reassess)  Factors Affecting Nutritional Intake: NPO, on mechanical ventilation    Anthropometrics    Temp: 99.1 °F (37.3 °C)  Height Method: Stated  Height: 6' (182.9 cm)  Height (inches): 72 in  Weight Method: Bed Scale  Weight: 60 kg (132 lb 4.4 oz)  Weight (lb): 132.28 lb  Ideal Body Weight (IBW), Male: 178 lb  % Ideal Body Weight, Male (lb): 74.31 lb  BMI (Calculated): 18  BMI Grade: 17 - 18.4 protein-energy malnutrition grade I       Lab/Procedures/Meds    Pertinent Labs Reviewed: reviewed  Pertinent Labs Comments: HgbA1c 5.2, POCT Glu 160-169, BUN 26, Cr 1.8  Pertinent Medications Reviewed: reviewed  Pertinent Medications Comments:  IVF, precedex, cardene    Estimated/Assessed Needs    Weight Used For Calorie Calculations: 60 kg (132 lb 4.4 oz)  Energy Calorie Requirements (kcal): 1593  Energy Need Method: [place]  Protein Requirements: 72-90g(1.2-1.5g/kg)  Weight Used For Protein Calculations: 60 kg (132 lb 4.4 oz)  Fluid Requirements (mL): 1mL/kcal or per MD     RDA Method (mL): 1593         Nutrition Prescription Ordered    Current Diet Order: NPO    Evaluation of Received Nutrient/Fluid Intake    IV Fluid (mL): 1800  I/O: -I/O, LBM 7/3  % Intake of Estimated Energy Needs: 0 - 25 %  % Meal Intake: NPO    Nutrition Risk    Level of Risk/Frequency of Follow-up: high(f/u 2x/week)     Assessment and Plan    Nutrition Problem  Inadequate energy intake    Related to (etiology):   NPO    Signs and Symptoms (as evidenced by):   Pt receiving <85% EEN and EPN.     Intervention:  Collaboration of nutrition care with providers    Nutrition Diagnosis Status:   New         Monitor and Evaluation    Food and Nutrient Intake: energy intake, food and beverage intake, enteral nutrition intake  Food and Nutrient Adminstration: diet order, enteral and parenteral nutrition administration  Anthropometric Measurements: weight, weight change, body mass index  Biochemical Data, Medical Tests and Procedures: electrolyte and renal panel, gastrointestinal profile, glucose/endocrine profile, inflammatory profile, lipid profile  Nutrition-Focused Physical Findings: overall appearance        Nutrition Follow-Up    RD Follow-up?: Yes

## 2019-07-04 NOTE — PROGRESS NOTES
2335 called to ED 2 to prepare patient for intubation. MDS at HOB patient bagged via 100% Fio2 Ambu bagging with oxygen attached. 2341 intubation medications given and at 2343 patient intubated x 1 attempt with glidescope per MD with 7.5 OETT which is at 25cm shin at lips. BBS equal, positive color change, x ray ordered to verify tube placement. Patients ETT secured with commercial tube hernandez and is at 25 cm shin at lips and placed on left side. 2343 patient placed on  ventilator at A/C 16, , Peep +5, and Fio2 50% as ordered per MD. Initial ventilator check and patient assessment done and noted. RT to follow.

## 2019-07-04 NOTE — PROGRESS NOTES
Notified MERI Landeros of troponin I level of 2.246. Orders received to obtain Q8 troponin I levels; NP to place Cardiology consult. Will continue to monitor.

## 2019-07-04 NOTE — LOPA/MORA/SWTA/AOC/AEB
Thank you for this referral.  Mr. Cristobal is not a candidate for organ donation.  Please call in TOD should it occur at [phone] so that [facility] may screen for tissue and eye donation.         [facility] ,   Antonia Damico

## 2019-07-04 NOTE — ASSESSMENT & PLAN NOTE
SEE HPI    LMCA stroke. CT head normal. Limited medical history, but no clear contraindications for stroke  TPA recommended  Transfer and thrombectomy evaluation.    Antithrombotics for secondary stroke prevention: Antiplatelets: None: Hold all Antithrombotics x 24 hours after IV t-PA administration    Statins for secondary stroke prevention and hyperlipidemia, if present:   Statins: Atorvastatin- 80 mg daily    Aggressive risk factor modification: CAD     Rehab efforts: The patient has been evaluated by a stroke team provider and the therapy needs have been fully considered based off the presenting complaints and exam findings. The following therapy evaluations are needed: PT evaluate and treat, OT evaluate and treat, SLP evaluate and treat, PM&R evaluate for appropriate placement    Diagnostics ordered/pending: CTA Head to assess vasculature , CTA Neck/Arch to assess vasculature, HgbA1C to assess blood glucose levels, Lipid Profile to assess cholesterol levels, MRA head to assess vasculature, MRA neck/arch to assess vasculature, MRI head without contrast to assess brain parenchyma, TTE to assess cardiac function/status , Other: LINQ device    VTE prophylaxis: None: Reason for No Pharmacological VTE Prophylaxis: Mechanical prophylaxis: Place SCDs    BP parameters: Infarct: Post tPA, SBP <180

## 2019-07-04 NOTE — HPI
Mr Tevin Cristobal is a 72 yo male with PMHx of CAD, CHF [facility] post-tPA. Patient's had LMCA syndrome, LKN 7/3 at 18:30, NIHSS 23.  Patient had episode of agitation while getting transferred and received versed. Upon [facility] patient found to have cheyne-mejia respiration and not responsive, unable to do NIHSS. Patient underwent intubation which tolerated well. Patient underwent CTA which revealed left carotid occlusion. Patient was taken to IR thrombectomy.

## 2019-07-04 NOTE — PROGRESS NOTES
Patient arrived to [facility] from [facility] via EMS >> [facility] ED      Type of stroke/diagnosis: L MCA w/ ICA occlusion     TPA start 1955 end time 2055    Thrombectomy start 0136 end 0214    Current symptoms: intubated,     Skin assessment done: yes  Wounds noted:     NCC notified: ARJUN Boyd and MERI Landeros at bedside

## 2019-07-05 PROBLEM — I21.4 NSTEMI (NON-ST ELEVATED MYOCARDIAL INFARCTION): Status: ACTIVE | Noted: 2019-01-01

## 2019-07-05 PROBLEM — I63.413 CEREBRAL INFARCTION DUE TO BILATERAL EMBOLISM OF MIDDLE CEREBRAL ARTERIES: Status: ACTIVE | Noted: 2019-01-01

## 2019-07-05 PROBLEM — I63.413 CEREBROVASCULAR ACCIDENT (CVA) DUE TO BILATERAL EMBOLISM OF MIDDLE CEREBRAL ARTERIES: Status: ACTIVE | Noted: 2019-01-01

## 2019-07-05 PROBLEM — N17.9 ACUTE RENAL FAILURE: Status: ACTIVE | Noted: 2019-01-01

## 2019-07-05 NOTE — PT/OT/SLP PROGRESS
Physical Therapy      Patient Name:  Tevin Cristobal   MRN:  [id number]    PT consult received and acknowledged.  The patient is currently intubated and appropriate for only one therapy discipline at this time.  OT will follow this patient while they are only appropriate for in bed activities and will notify PT when the patient is ready for mobilization.  PT will follow up when patient is appropriate for EOB or OOB activities.     Mouna Fraga, PT  7/5/2019  [phone] (pager)

## 2019-07-05 NOTE — HPI
74 yo male with HTN, dilated cardiomyopathy? (per sister) who was admitted for thromboembolic CVA with R sided deficits (L ICA occlusion) for which received tPA at OSH where he was also intubated for airway protection, and was transferred here where he got thrombectomy. He was then admitted to the neuro ICU and cardiology was consulted for troponin elevation.      Central line was placed and CVP was 10-15 on 7/4. BNP was >4K, troponins initially were 2 but increased to 50+. Lactic acid decreased from 9 to 1.4.  EKG showed inferolateral STd/TWI suggestive of ischemia  He received a total of 200 mg of lasix today on 7/4.

## 2019-07-05 NOTE — ASSESSMENT & PLAN NOTE
Patient remains comatose. We have a low index of suspicion for Type 1 myocardial infarction. Recommendations are as follows-  -Follow-up on transthoracic echo  -continue lasix and titrate as needed to CVP <8  -continue ASA and statin regimens

## 2019-07-05 NOTE — PLAN OF CARE
Problem: Adult Inpatient Plan of Care  Goal: Plan of Care Review  POC reviewed with pt at 0500. Pt unable to verbalize understanding, sister verbalized understanding. Questions and concerns addressed. 2 units of RBC's given overnight for low HGB/HCT, pt tolerated well. XRAY taken, pneumothorax was found on the left side. Fermen chest tube inserted connected to water seal at 20 mm hg per order. Will continue to monitor. See flowsheets for full assessment and VS info.

## 2019-07-05 NOTE — PLAN OF CARE
Problem: Adult Inpatient Plan of Care  Goal: Plan of Care Review  POC reviewed with pt at 1700. Pt unable to verbalize understanding. No acute events this shift. Pt progressing toward goals. Will continue to monitor. See flowsheets for full assessment and VS info

## 2019-07-05 NOTE — PROGRESS NOTES
72 yo male adm overnight w b/l mca occlusions and nstemi. Pt tx w tpa and L mca thrombectomy. R mca not intervened as felt it may be chronic in setting of initial presentation as L mca syndrome.     On eval this am pt has rising troponins, ekg with inferolateral ischemia, and became acutely hypotensive after po metoprolol. Uop low, lactate 9. Central line emergently placed revealing elevated cvp at 15-17, svo2 44. Pocus with severely reduced ef. On exam pt had cool extremities w prolonged cap refill, dilated jugular veins b/l up to jaw, and coarse bs. Mri completed and notable for extensive bilateral stroke.     Vital signs, lab studies, and imaging reviewed by me  Agitated, roving eom, cn intact, minimal motor exam to nox stim.   Cool extremities, cap refill >3 s  No dependent edema  ett in place, on ac  Belly soft, nontender, no hepatosplenomegaly  Garsia in place with yellow urine    A/p  B/l cortical ischemic stroke complicated by coma/debility, etiology embolic (artery to artery vs. cardioembolic in setting of low ef. Poor prognosis  -post tpa/angio monitoring  -permissive htn  -statin, asa  -goc discussion w daughter needed    nstemi w inferolateral ischemia complicated by cardiogenic shock. Improved hemodynamics and uop and tissue perfusion w milrinone, trops however cont to rise.   -cards on board  -consider starting heparin after post tpa window  -cont inotropic support    sung on ckd  -mgt as above    Aspiration. R ul opacity  -start abx for community acquired aspiration pna (ctx and flagyl)  -check procal    I spent 72 min of uninterrupted critical care time caring for this critically ill patient at bedside titrating vasoactive gtts and reviewing frequent laboratory values exclusive of procedures and teaching.

## 2019-07-05 NOTE — CONSULTS
[facility]  General Surgery  Consult Note    Inpatient consult to General Surgery  Consult performed by: Rosalinda Crane MD  Consult ordered by: Kristen Kim NP        Subjective:     Chief Complaint/Reason for Admission: CVA    History of Present Illness:   Tevin Cristobal is a 73 y.o. male with h/o HTN admitted to Neuro ICU for thromboembolic CVA. Following attempted subclavian placement, patient with resultant large left sided pneumothorax. Currently hemodynamically stable. No concern for tension physiology. Surgery consulted for chest tube placement.     No current facility-administered medications on file prior to encounter.      No current outpatient medications on file prior to encounter.       Review of patient's allergies indicates:  No Known Allergies    History reviewed. No pertinent past medical history.  No past surgical history on file.  Family History     None        Tobacco Use    Smoking status: Unknown If Ever Smoked   Substance and Sexual Activity    Alcohol use: Not on file    Drug use: Not on file    Sexual activity: Not on file     Review of Systems   Unable to perform ROS: Intubated     Objective:     Vital Signs (Most Recent):  Temp: 99.8 °F (37.7 °C) (07/05/19 0420)  Pulse: 75 (07/05/19 0420)  Resp: 20 (07/05/19 0420)  BP: 129/63 (07/05/19 0420)  SpO2: 100 % (07/05/19 0420) Vital Signs (24h Range):  Temp:  [99.1 °F (37.3 °C)-102 °F (38.9 °C)] 99.8 °F (37.7 °C)  Pulse:  [] 75  Resp:  [19-60] 20  SpO2:  [75 %-100 %] 100 %  BP: ()/(26-89) 129/63  Arterial Line BP: ()/(40-97) 151/54     Weight: 60 kg (132 lb 4.4 oz)  Body mass index is 17.94 kg/m².      Intake/Output Summary (Last 24 hours) at 7/5/2019 0434  Last data filed at 7/5/2019 0305  Gross per 24 hour   Intake 1781.01 ml   Output 1015 ml   Net 766.01 ml       Physical Exam   Constitutional: He appears well-developed and well-nourished. No distress.   Cardiovascular: Normal rate and  regular rhythm.   Pulmonary/Chest:   Vent Mode: A/C  Oxygen Concentration (%):  () 50  Resp Rate Total:  (18 br/min-43 br/min) 20 br/min  Vt Set:  (400 mL) 400 mL  PEEP/CPAP:  (5 cmH20-10 cmH20) 10 cmH20  Pressure Support:  (0 cmH20) 0 cmH20  Mean Airway Pressure:  (5.9 mtT32-15 cmH20) 12 cmH20     Skin: Skin is warm and dry.   Psychiatric: He has a normal mood and affect. His behavior is normal.       Significant Labs:  CBC:   Recent Labs   Lab 07/05/19  0209   WBC 18.72*  18.72*   RBC 2.61*  2.61*   HGB 7.6*  7.6*   HCT 23.5*  23.5*     198   MCV 90  90   MCH 29.1  29.1   MCHC 32.3  32.3     CMP:   Recent Labs   Lab 07/05/19  0209      CALCIUM 8.2*   ALBUMIN 2.4*   PROT 9.4*   *   K 4.8   CO2 14*   *   BUN 41*   CREATININE 2.8*   ALKPHOS 47*   ALT 1,222*   AST 2,273*   BILITOT 1.2*       Significant Diagnostics:  I have reviewed all pertinent imaging results/findings within the past 24 hours.    Assessment/Plan:     Active Diagnoses:    Diagnosis Date Noted POA    PRINCIPAL PROBLEM:  Cerebral infarction due to embolism of left middle cerebral artery [I63.412] 07/03/2019 Yes    Cerebrovascular accident (CVA) [I63.9] 07/04/2019 Yes    Cytotoxic brain edema [G93.6] 07/04/2019 Unknown    Impaired mobility and activities of daily living [Z74.09] 07/04/2019 Unknown    Dysphagia [R13.10] 07/04/2019 Unknown    Acute respiratory failure with hypercapnia [J96.02] 07/04/2019 Unknown    Elevated troponin [R74.8] 07/04/2019 Yes      Problems Resolved During this Admission:     Left sided pigtail catheter placed with evacuation of pneumothorax  Continue chest tube to suction  Follow up post procedure CXR    Thank you for your consult. I will follow-up with patient. Please contact us if you have any additional questions.    Rosalinda Crane MD  General Surgery  [facility]

## 2019-07-05 NOTE — ASSESSMENT & PLAN NOTE
Initial troponins were 2 then increased to 18  EKG showed inferolateral STd/TWI suggestive of ischemia  Does not look overloaded on exam and CVP is WNL but initial BNP was > 4K  Got a total of 200 mg of lasix today but urine output documented is only 500s    Calculations on milrinone 0.125:  CVP is 4 now  SVO2 is 73 now  MAP 76  BSA 1.74 m2  CO 8.84  CI 5.08      Recommendations:  -Whenever/if deemed safe by primary team, consider starting the patient on clopidogrel (with loading dose of 300), heparin drip ACS protocol  -Continue ASA 81 mg and high intensity statin  -TTE in the AM  -Trend CVP and SVO2 to calculate hemodynamics  -Trend troponins and EKGs  -Consider discontinuing milrinone as his hemodynamics do not suggest cardiogenic shock

## 2019-07-05 NOTE — SUBJECTIVE & OBJECTIVE
History reviewed. No pertinent past medical history.    No past surgical history on file.    Review of patient's allergies indicates:  No Known Allergies    No current facility-administered medications on file prior to encounter.      No current outpatient medications on file prior to encounter.     Family History     None        Tobacco Use    Smoking status: Unknown If Ever Smoked   Substance and Sexual Activity    Alcohol use: Not on file    Drug use: Not on file    Sexual activity: Not on file     Review of Systems   Unable to perform ROS: mental status change     Objective:     Vital Signs (Most Recent):  Temp: 98.5 °F (36.9 °C) (07/05/19 1105)  Pulse: 73 (07/05/19 1308)  Resp: 14 (07/05/19 1308)  BP: (!) 140/67 (07/05/19 1200)  SpO2: 100 % (07/05/19 1308) Vital Signs (24h Range):  Temp:  [98.2 °F (36.8 °C)-102 °F (38.9 °C)] 98.5 °F (36.9 °C)  Pulse:  [73-97] 73  Resp:  [14-60] 14  SpO2:  [92 %-100 %] 100 %  BP: (103-169)/(51-89) 140/67  Arterial Line BP: (106-191)/(36-97) 157/62     Weight: 59.9 kg (132 lb)  Body mass index is 18.41 kg/m².    SpO2: 100 %  O2 Device (Oxygen Therapy): ventilator      Intake/Output Summary (Last 24 hours) at 7/5/2019 1330  Last data filed at 7/5/2019 1000  Gross per 24 hour   Intake 1508.82 ml   Output 1025 ml   Net 483.82 ml       Lines/Drains/Airways     Central Venous Catheter Line                 Percutaneous Central Line Insertion/Assessment - triple lumen  07/04/19 1030 right subclavian 1 day          Drain                 NG/OG Tube 07/04/19 0007 [place] sump 18 Fr. Right mouth 1 day         Chest Tube 07/05/19 0430 1 Left less than 1 day         Urethral Catheter 07/04/19 1500 less than 1 day          Airway                 Airway - Non-Surgical 07/04/19 0005 Endotracheal Tube 1 day          Arterial Line                 Arterial Line 07/04/19 0311 Right Radial 1 day          Peripheral Intravenous Line                 Peripheral IV - Single Lumen 07/04/19 0005 18 G  Left Antecubital 1 day         Peripheral IV - Single Lumen 07/04/19 0005 18 G Right Forearm 1 day                Physical Exam   Constitutional: He is intubated.   HENT:   Head: Normocephalic and atraumatic.   Cardiovascular: Normal rate, regular rhythm and intact distal pulses.   Pulmonary/Chest: He is intubated.   Musculoskeletal: He exhibits no edema.   Neurological: He is unresponsive.   Skin: Skin is warm and dry.       Significant Labs:   All significant labs were reviewed.    Significant Imaging: Echocardiogram:   Transthoracic echo (TTE) complete (Cupid Only):  Demonstrated:  Moderate left ventricular enlargement.  · Severely decreased left ventricular systolic function. The estimated   ejection fraction is 15%  · Mildly to moderately reduced right ventricular systolic function.  · Grade II (moderate) left ventricular diastolic dysfunction consistent   with pseudonormalization.  · Severe left atrial enlargement.  · Mild tricuspid regurgitation.  · Mild mitral regurgitation.  · 1.5cm mobile thrombus noted in the left ventricular apex.

## 2019-07-05 NOTE — CONSULTS
[facility]  Cardiology  Consult Note    Patient Name: Tevin Cristobal  MRN: [id number]  Admission Date: 7/3/2019  Hospital Length of Stay: 0 days  Code Status: Full Code   Attending Provider: Torey Tucker MD   Consulting Provider: Domenic Gilmore MD  Primary Care Physician: No primary care provider on file.  Principal Problem:Cerebral infarction due to embolism of left middle cerebral artery    Patient information was obtained from relative(s) and ER records.     Inpatient consult to Cardiology  Consult performed by: Domenic Gilmore MD  Consult ordered by: Cayden Hardin PA-C        Subjective:     Chief Complaint:  Elevated troponins     HPI:   74 yo male with HTN, dilated cardiomyopathy? (per sister) who was admitted for thromboembolic CVA with R sided deficits (L ICA occlusion) for which received tPA at OSH where he was also intubated for airway protection, and was transferred here where he got thrombectomy. He was then admitted to the neuro ICU and cardiology was consulted for troponin elevation.      Central line was placed and CVP was 10-15 today. BNP was >4K, troponins initially were 2 but increased to 18. Lactic acid decreased from 9 to 7 early PM.  Initial troponins were 2 then increased to 18  EKG showed inferolateral STd/TWI suggestive of ischemia  He received a total of 200 mg of lasix today     Calculations on milrinone 0.125:  CVP is 4 now  SVO2 is 73 now  MAP 76  BSA 1.74 m2  CO 8.84  CI 5.08        History reviewed. No pertinent past medical history.    No past surgical history on file.    Review of patient's allergies indicates:  No Known Allergies    No current facility-administered medications on file prior to encounter.      No current outpatient medications on file prior to encounter.     Family History     None        Tobacco Use    Smoking status: Unknown If Ever Smoked   Substance and Sexual Activity    Alcohol use: Not on  file    Drug use: Not on file    Sexual activity: Not on file     Review of Systems   Unable to perform ROS: intubated     Objective:     Vital Signs (Most Recent):  Temp: 99.5 °F (37.5 °C) (07/04/19 1905)  Pulse: 85 (07/04/19 1905)  Resp: 20 (07/04/19 1905)  BP: (!) 120/56 (07/04/19 1905)  SpO2: 100 % (07/04/19 1905) Vital Signs (24h Range):  Temp:  [97.8 °F (36.6 °C)-102 °F (38.9 °C)] 99.5 °F (37.5 °C)  Pulse:  [] 85  Resp:  [16-41] 20  SpO2:  [75 %-100 %] 100 %  BP: ()/() 120/56  Arterial Line BP: ()/(40-60) 130/48     Weight: 60 kg (132 lb 4.4 oz)  Body mass index is 17.94 kg/m².    SpO2: 100 %  O2 Device (Oxygen Therapy): ventilator      Intake/Output Summary (Last 24 hours) at 7/4/2019 1938  Last data filed at 7/4/2019 1905  Gross per 24 hour   Intake 1151.1 ml   Output 920 ml   Net 231.1 ml       Lines/Drains/Airways     Central Venous Catheter Line                 Percutaneous Central Line Insertion/Assessment - triple lumen  07/04/19 1030 right subclavian less than 1 day          Drain                 NG/OG Tube 07/04/19 0007 [place] sump 18 Fr. Right mouth less than 1 day          Airway                 Airway - Non-Surgical 07/04/19 0005 Endotracheal Tube less than 1 day          Arterial Line                 Arterial Line 07/04/19 0311 Right Radial less than 1 day          Peripheral Intravenous Line                 Peripheral IV - Single Lumen 07/04/19 0005 18 G Left Antecubital less than 1 day         Peripheral IV - Single Lumen 07/04/19 0005 18 G Right Forearm less than 1 day                Physical Exam   Constitutional: He is oriented to person, place, and time. He appears well-developed and well-nourished.   HENT:   Head: Normocephalic and atraumatic.   Nose: Nose normal.   Mouth/Throat: No oropharyngeal exudate.   Eyes: Right eye exhibits no discharge. Left eye exhibits no discharge. No scleral icterus.   Neck: Normal range of motion. Neck supple. No JVD present.    Cardiovascular: Normal rate, regular rhythm, S1 normal and S2 normal. Exam reveals no gallop, no S3, no S4, no distant heart sounds, no friction rub, no midsystolic click and no opening snap.   No murmur heard.  Pulses:       Radial pulses are 2+ on the right side, and 2+ on the left side.        Femoral pulses are 2+ on the right side, and 2+ on the left side.  Pulmonary/Chest: No respiratory distress.   Intubated/sedated  Coarse breath sounds   Abdominal: Soft. Bowel sounds are normal. He exhibits no distension. There is no tenderness. There is no rebound.   Musculoskeletal: Normal range of motion. He exhibits no edema, tenderness or deformity.   Lymphadenopathy:     He has no cervical adenopathy.   Neurological: He is alert and oriented to person, place, and time. No cranial nerve deficit.   Skin: Skin is warm and dry.   Psychiatric: He has a normal mood and affect. His behavior is normal.       Significant Labs: All pertinent lab results from the last 24 hours have been reviewed.    Significant Imaging: All pertinent images from the last 24h have been reviewed.      Assessment and Plan:     Elevated troponin  Initial troponins were 2 then increased to 18  EKG showed inferolateral STd/TWI suggestive of ischemia  Does not look overloaded on exam and CVP is WNL but initial BNP was > 4K  Got a total of 200 mg of lasix today but urine output documented is only 500s    Calculations on milrinone 0.125:  CVP is 4 now  SVO2 is 73 now  MAP 76  BSA 1.74 m2  CO 8.84  CI 5.08      Recommendations:  -Whenever/if deemed safe by primary team, consider starting the patient on clopidogrel (with loading dose of 300), heparin drip ACS protocol  -Continue ASA 81 mg and high intensity statin  -TTE in the AM  -Trend CVP and SVO2 to calculate hemodynamics  -Trend troponins (until they start decreasing) and EKGs  -Consider discontinuing milrinone as his hemodynamics do not suggest cardiogenic shock  -Will need ischemic work up  once stable   -Follow urine output (strict I/O)  -If CVP increases, consider giving more lasix IV    Cardiology will follow      VTE Risk Mitigation (From admission, onward)        Ordered     Reason for No Pharmacological VTE Prophylaxis  Once      07/04/19 0015     IP VTE HIGH RISK PATIENT  Once      07/04/19 0015     Place sequential compression device  Until discontinued      07/04/19 0015          Thank you for your consult.     Domenic Bermudez MD  Cardiology   [facility]

## 2019-07-05 NOTE — PLAN OF CARE
07/05/19 1541   Discharge Assessment   Assessment Type Discharge Planning Assessment   Confirmed/corrected address and phone number on facesheet? Yes   Assessment information obtained from? Caregiver  (sister)   Expected Length of Stay (days) 7   Prior to hospitilization cognitive status: Alert/Oriented   Prior to hospitalization functional status: Independent   Current cognitive status: Coma/Sedated/Intubated   Current Functional Status: Completely Dependent   Facility Arrived From: [facility]    Lives With parent(s);sibling(s)  (Lives with 93 y/o mother, sister and special needs brother.)   Able to Return to Prior Arrangements yes   Is patient able to care for self after discharge? Unable to determine at this time (comments)   Who are your caregiver(s) and their phone number(s)? Marissa Segundo (sister) [phone], Kelsey Cristobal (dtr) [phone]   Patient's perception of discharge disposition other (comments)  (rodríguez)   Readmission Within the Last 30 Days no previous admission in last 30 days   Patient currently being followed by outpatient case management? No   Patient currently receives any other outside agency services? No   Equipment Currently Used at Home none   Do you have any problems affording any of your prescribed medications? No   Is the patient taking medications as prescribed? yes   Does the patient have transportation home? Yes   Transportation Anticipated family or friend will provide   Does the patient receive services at the [facility]? No   Discharge Plan A Long-term acute care facility (LTAC)   Discharge Plan B Rehab   DME Needed Upon Discharge  other (see comments)  (tbd)   Patient/Family in Agreement with Plan yes         Discharge/ My Health Packet Folder Given to patient/family:      yes      PCP:    Dr. Roman Ford  Phone: [phone]; Fax: [phone]    Pharmacy:    [facility] [address] [place] [address] - [address]  [address]  [facility] [address]  Phone:  [phone] Fax: [phone]        Emergency Contacts:    Extended Emergency Contact Information  Primary Emergency Contact: ena oliva  Mobile Phone: [phone]  Relation: Sister  Preferred language: English   needed? No    Insurance:  Payor: MEDICARE / Plan: MEDICARE PART A & B / Product Type: Government /     Brandy Estrella RN, CCRN-K, [facility]  Neuro-Critical Care   [phone]

## 2019-07-05 NOTE — PROGRESS NOTES
[facility]  Vascular Neurology  Comprehensive Stroke Center  Progress Note    Assessment/Plan:     * Cerebral infarction due to bilateral embolism of middle cerebral arteries  Patient is a 74 yo male with PMHx of CAD and CHF p/w LMCA syndrome started one hour prior to tele-stroke, received tPA and transferred [place] [facility] [place]. Patient got agitated on the way to [facility] which received Versed, upon arrival patient in respiratory distress, s/p intubation, CTA  revealed left carotid terminus occlusion.  - MRI with Large, acute CVA throughout bilateral MCA and L YRIS distribution. No shift, herniation, hydrocephalus, or hemorrhagic conversion noted.     Antithrombotics for secondary stroke prevention: Antiplatelets: ASA  Statins for secondary stroke prevention and hyperlipidemia, if present:   Statins: Atorvastatin- 40 mg daily    Aggressive risk factor modification: CAD     Rehab efforts: The patient has been evaluated by a stroke team provider and the therapy needs have been fully considered based off the presenting complaints and exam findings. The following therapy evaluations are needed: PT evaluate and treat, OT evaluate and treat, SLP evaluate and treat -- Further assessment pending extubation    Diagnostics ordered/pending: TTE to assess cardiac function/status   · Moderate left ventricular enlargement.  · Severely decreased left ventricular systolic function. The estimated ejection fraction is 15%  · Mildly to moderately reduced right ventricular systolic function.  · Grade II (moderate) left ventricular diastolic dysfunction consistent with pseudonormalization.  · Severe left atrial enlargement.  · Mild tricuspid regurgitation.  · Mild mitral regurgitation.  · 1.5cm mobile thrombus noted in the left ventricular apex.    VTE prophylaxis: None: Reason for No Pharmacological VTE Prophylaxis: Mechanical prophylaxis: Place SCDs    BP parameters: Infarct: Post sucessful thrombectomy, SBP  <140        Acute renal failure  -- Management per NCC    NSTEMI (non-ST elevated myocardial infarction)  Management per NCC/Cardiology    Acute respiratory failure with hypercapnia  -- Management per NCC  -- Now with L Pneuomothorax - chest tube in place  -- On CFX/Flagyl for presumed aspiration PNA       STROKE DOCUMENTATION   Acute Stroke Times   Last Known Normal Date: 07/03/19  Last Known Normal Time: 1830  Symptom Onset Date: 07/03/19  Stroke Team Arrival Date: 07/03/19  Stroke Team Arrival Time: 1924  CT Interpretation Time: 1932  Decision to Treat Time for Alteplase: 1940  Decision to Treat Time for IR: 0032    NIH Scale:  1a. Level of Consciousness: 3-->Responds only with reflex motor or autonomic effects or totally unresponsive, flaccid, and areflexic  1b. LOC Questions: 2-->Answers neither question correctly  1c. LOC Commands: 2-->Performs neither task correctly  2. Best Gaze: 2-->Forced deviation, or total gaze paresis not overcome by the oculocephalic maneuver  3. Visual: 3-->Bilateral hemianopia (blind including cortical blindness)  4. Facial Palsy: 0-->Normal symmetrical movements  5a. Motor Arm, Left: 2-->Some effort against gravity, limb cannot get to or maintain (if cued) 90 (or 45) degrees, drifts down to bed, but has some effort against gravity  5b. Motor Arm, Right: 2-->Some effort against gravity, limb cannot get to or maintain (if cued) 90 (or 45) degrees, drifts down to bed, but has some effort against gravity  6a. Motor Leg, Left: 2-->Some effort against gravity, leg falls to bed by 5 secs, but has some effort against gravity  6b. Motor Leg, Right: 2-->Some effort against gravity, leg falls to bed by 5 secs, but has some effort against gravity  7. Limb Ataxia: 0-->Absent  8. Sensory: 0-->Normal, no sensory loss  9. Best Language: 3-->Mute, global aphasia, no usable speech or auditory comprehension  10. Dysarthria: (UN) Intubated or other physical barrier  11. Extinction and Inattention  (formerly Neglect): 2-->Profound sydnee-inattention/extinction more than 1 modality  Total (NIH Stroke Scale): 25       Hemorrhagic change of an Ischemic Stroke: Does this patient have an ischemic stroke with hemorrhagic changes? No     Neurologic Chief Complaint: Cerebral infarction throughout bilateral MCA and L YRIS distribution.    Subjective:     Interval History: Bilateral cortical ischemia CVA complicated by NSTEMI +/- cardiogenic shock, Acute renal failure, aspiration PNA, and L pneumothorax. Poor neuro exam.     HPI, Past Medical, Family, and Social History remains the same as documented in the initial encounter.     Review of Systems   Unable to perform ROS: Intubated     Scheduled Meds:   aspirin  81 mg Per NG tube Daily    cefTRIAXone (ROCEPHIN) IVPB  2 g Intravenous Q24H    And    metronidazole  500 mg Intravenous Q8H    chlorhexidine  15 mL Mouth/Throat BID    famotidine  20 mg Per OG tube Daily    senna-docusate 8.6-50 mg  1 tablet Per OG tube Daily     Continuous Infusions:  PRN Meds:fentaNYL, fentaNYL, pneumoc 13-quita conj-dip cr(PF), sodium chloride 0.9%, sodium chloride 0.9%    Objective:     Vital Signs (Most Recent):  Temp: 98.2 °F (36.8 °C) (07/05/19 0700)  Pulse: 76 (07/05/19 1000)  Resp: 16 (07/05/19 1000)  BP: 136/72 (07/05/19 1000)  SpO2: 100 % (07/05/19 1000)  BP Location: Right arm    Vital Signs Range (Last 24H):  Temp:  [98.2 °F (36.8 °C)-102 °F (38.9 °C)]   Pulse:  [74-97]   Resp:  [15-60]   BP: ()/(51-89)   SpO2:  [92 %-100 %]   Arterial Line BP: ()/(36-97)   BP Location: Right arm    Physical Exam   Constitutional: No distress.   Cardiovascular: Normal rate.   No murmur heard.  Pulmonary/Chest: No respiratory distress. He has rales.   Abdominal: He exhibits no distension.   Neurological: He is unresponsive.   Skin: Capillary refill takes 2 to 3 seconds. He is not diaphoretic.       Laboratory:  CMP:   Recent Labs   Lab 07/05/19  0209   CALCIUM 8.2*   ALBUMIN 2.4*   PROT  9.4*   *   K 4.8   CO2 14*   *   BUN 41*   CREATININE 2.8*   ALKPHOS 47*   ALT 1,222*   AST 2,273*   BILITOT 1.2*     CBC:   Recent Labs   Lab 07/05/19  0627   WBC 15.75*   RBC 2.84*   HGB 8.4*   HCT 25.0*      MCV 88   MCH 29.6   MCHC 33.6       Diagnostic Results     Brain Imaging   07.04.2019 - MRI Brain - Large acute strokes throughout the bilateral MCA and left YRIS distributions.  No midline shift, herniation, hydrocephalus or hemorrhagic conversion.        Pacheco Davis MD  Comprehensive Stroke Center  Department of Vascular Neurology   [facility]

## 2019-07-05 NOTE — PROCEDURES
Tevin Cristobal is a 73 y.o. male patient.    Temp: 99.8 °F (37.7 °C) (07/05/19 0420)  Pulse: 75 (07/05/19 0452)  Resp: 16 (07/05/19 0452)  BP: 129/63 (07/05/19 0420)  SpO2: 100 % (07/05/19 0452)  Weight: 60 kg (132 lb 4.4 oz) (07/04/19 0353)  Height: 6' (182.9 cm) (07/04/19 0301)       Chest Tube Insertion  Date/Time: 7/5/2019 5:06 AM  Performed by: Rosalinda Crane MD  Authorized by: Rosalinda Crane MD   Consent Done: Yes  Consent: Written consent obtained.  Indications: pneumothorax  Patient sedated: yes  Analgesia: fentanyl  Anesthesia: local infiltration    Anesthesia:  Local Anesthetic: lidocaine 1% without epinephrine  Preparation: skin prepped with ChloraPrep  Placement location: left lateral  Tube size: 8 Swiss  Tension pneumothorax heard: no  Tube connected to: suction  Suture material: 2-0 silk  Dressing: 4x4 sterile gauze  Patient tolerance: Patient tolerated the procedure well with no immediate complications        Rosalinda Crane MD, PGY-4  General Surgery  [phone]      Rosalinda Crane  7/5/2019

## 2019-07-05 NOTE — PLAN OF CARE
07/05/19 1550   Post-Acute Status   Post-Acute Authorization Placement   Post-Acute Placement Status Awaiting Internal Medical Clearance   Discharge Delays None known at this time       Brandy Estrella RN, CCRN-K, [facility]  Neuro-Critical Care   [phone]

## 2019-07-05 NOTE — PLAN OF CARE
Problem: Occupational Therapy Goal  Goal: Occupational Therapy Goal  OT evaluation completed.  ANA Griffiths  7/5/2019

## 2019-07-05 NOTE — PT/OT/SLP EVAL
"Occupational Therapy   Evaluation    Name: Tevin Cristobal  MRN: [id number]  Admitting Diagnosis:  Cerebral infarction due to embolism of left middle cerebral artery      Recommendations:     Discharge Recommendations: (pending extubation)  Discharge Equipment Recommendations:  hospital bed  Barriers to discharge:  Inaccessible home environment, Decreased caregiver support    Assessment:     Tevin Cristobal is a 73 y.o. male with a medical diagnosis of Cerebral infarction due to embolism of left middle cerebral artery.  He presents with performance deficits affecting function: weakness, impaired endurance, impaired sensation, impaired self care skills, visual deficits, gait instability, impaired functional mobilty, impaired cognition, decreased lower extremity function, decreased upper extremity function, impaired balance, decreased coordination, decreased safety awareness, impaired coordination, abnormal tone, impaired fine motor, impaired cardiopulmonary response to activity, impaired joint extensibility, impaired skin, decreased ROM.      Rehab Prognosis: Good; patient would benefit from acute skilled OT services to address these deficits and reach maximum level of function.       Plan:     Patient to be seen 3 x/week to address the above listed problems via self-care/home management, neuromuscular re-education, cognitive retraining, therapeutic activities, therapeutic exercises, sensory integration  · Plan of Care Expires: 08/02/19  · Plan of Care Reviewed with: patient, sibling    Subjective     Patient:  Nonverbal; intubated  Sister:  "I went to get him from [place] because he was sick.  He's been with me for about 2 months." "We were talking and he went to get crackers and then he looked strange and was fumbling with package and was drooling and he wouldn't answer me."    Occupational Profile:  Per sister:  Patient resides in [place] with his mother (age 94), his sister and his brother (down's syndrome) " in one story home with one step to enter.  Patient is right handed.  PTA patient independent with ADLs, not driving.  Retired: .  Hobbies: TV.  Currently owns no DME.      Pain/Comfort:  · Pain Rating 1: 0/10  · Pain Rating Post-Intervention 1: 0/10    Patients cultural, spiritual, Evangelical conflicts given the current situation: (Gina)    Objective:     Communicated with: Nurse prior to session.  Patient found supine with arterial line, bed alarm, blood pressure cuff, chest tube, central line, pressure relief boots, peripheral IV, pulse ox (continuous), restraints, telemetry, ventilator(OG tube) upon OT entry to room.    General Precautions: Standard, aspiration, NPO, fall   Orthopedic Precautions:N/A   Braces: N/A     Occupational Performance:    Bed Mobility:    · Patient completed Rolling/Turning to Left with  total assistance  · Patient completed Rolling/Turning to Right with total assistance    Functional Mobility/Transfers:  · Dependent drawsheet transfers    Activities of Daily Living:  · Feeding:  NPO    · Grooming: total assistance while supine    Cognitive/Visual Perceptual:  Cognitive/Psychosocial Skills:     -       Oriented to: Daily orientation provided  -       Follows Commands/attention:unable to follow commands  -       Communication: nonverbal; intubated    Physical Exam:  Postural examination/scapula alignment:    -       Rounded shoulders  Skin integrity: Visible skin intact  Edema:  None noted  Upper Extremity Range of Motion:  PROM WNL    AMPAC 6 Click ADL:  AMPAC Total Score: 6    Treatment & Education:  Patient/ Family education provided for stroke warning signs, prevention guidelines and personal risk factors.  Patient/ Family verbalizing understanding via teach back method.   Patient education provided on role of OT, ROM and positioning.  Continued education, patient/ family training recommended.  Daily orientation provided.  PROM performed bilateral UE/LEs one set x 10 rep  in all planes of motion with stretches provided at end range; sustained stretch provided for ankle dorsiflexion.  Assistance and facilitation provided for upward rotation of the scapula during shoulder flexion and abduction.  Patient kept eyes closed 100% of the session.  Patient unable to follow commands.  Positioning provided for midline orientation with bilateral UEs elevated and heels lifted off mattress.  Gentle cervical rotation provided.   Family present during the session.   White board updated in patient's room.  OT asked if there were any other questions; patient/ family had no further questions.   Education:    Patient left supine with all lines intact and call button in reach    GOALS:   Multidisciplinary Problems     Occupational Therapy Goals        Problem: Occupational Therapy Goal    Goal Priority Disciplines Outcome Interventions   Occupational Therapy Goal     OT, PT/OT     Description:  Goals set 7/5 to be addressed for 14 days with expiration date, 7/19:  Patient will increase functional independence with ADLs by performing:    Patient will demonstrate rolling to the right with max assist.  Not met   Patient will demonstrate rolling to the left with max assist.   Not met  Patient will demonstrate supine -sit with max  assist.   Not met  Patient will demonstrate squat pivot transfers with max assist.   Not met  Patient will demonstrate grooming while seated with max assist.   Not met  Patient will demonstrate upper body dressing with max assist while seated EOB.   Not met  Patient will demonstrate lower body dressing with max assist while seated EOB.   Not met  Patient will demonstrate toileting with max assist.   Not met  Patient's family / caregiver will demonstrate independence and safety with assisting patient with self-care skills and functional mobility.     Not met  Patient's family / caregiver will demonstrate independence with providing ROM and changes in bed positioning.   Not  met  Patient and/or patient's family will verbalize understanding of stroke prevention guidelines, personal risk factors and stroke warning signs via teachback method.  Not met                           History:     History reviewed. No pertinent past medical history.    No past surgical history on file.    Time Tracking:     OT Date of Treatment: 07/05/19  OT Start Time: 0507  OT Stop Time: 0527  OT Total Time (min): 20 min    Billable Minutes:Evaluation 10  Therapeutic Exercise 10    ANA Griffiths  7/5/2019

## 2019-07-05 NOTE — ASSESSMENT & PLAN NOTE
-- Management per NCC  -- Now with L Pneuomothorax - chest tube in place  -- On CFX/Flagyl for presumed aspiration PNA

## 2019-07-05 NOTE — CONSULTS
[facility]  Cardiology  Consult Note    Patient Name: Tevin Cristobal  MRN: [id number]  Admission Date: 7/3/2019  Hospital Length of Stay: 1 days  Code Status: Full Code   Attending Provider: Torey Tucker MD   Consulting Provider: Dane Mobley MD  Primary Care Physician: No primary care provider on file.  Principal Problem:Cerebral infarction due to bilateral embolism of middle cerebral arteries    Patient information was obtained from relative(s) and ER records.     Consults  Subjective:     Chief Complaint:  Stroke     HPI:   72 yo male with HTN, dilated cardiomyopathy? (per sister) who was admitted for thromboembolic CVA with R sided deficits (L ICA occlusion) for which received tPA at [facility] where he was also intubated for airway protection, and was transferred here where he got thrombectomy. He was then admitted to the neuro ICU and cardiology was consulted for troponin elevation.      Central line was placed and CVP was 10-15 on 7/4. BNP was >4K, troponins initially were 2 but increased to 50+. Lactic acid decreased from 9 to 1.4.  EKG showed inferolateral STd/TWI suggestive of ischemia  He received a total of 200 mg of lasix today on 7/4.            History reviewed. No pertinent past medical history.    No past surgical history on file.    Review of patient's allergies indicates:  No Known Allergies    No current facility-administered medications on file prior to encounter.      No current outpatient medications on file prior to encounter.     Family History     None        Tobacco Use    Smoking status: Unknown If Ever Smoked   Substance and Sexual Activity    Alcohol use: Not on file    Drug use: Not on file    Sexual activity: Not on file     Review of Systems   Unable to perform ROS: mental status change     Objective:     Vital Signs (Most Recent):  Temp: 98.5 °F (36.9 °C) (07/05/19 1105)  Pulse: 73 (07/05/19 1308)  Resp: 14 (07/05/19 1308)  BP: (!) 140/67 (07/05/19  1200)  SpO2: 100 % (07/05/19 1308) Vital Signs (24h Range):  Temp:  [98.2 °F (36.8 °C)-102 °F (38.9 °C)] 98.5 °F (36.9 °C)  Pulse:  [73-97] 73  Resp:  [14-60] 14  SpO2:  [92 %-100 %] 100 %  BP: (103-169)/(51-89) 140/67  Arterial Line BP: (106-191)/(36-97) 157/62     Weight: 59.9 kg (132 lb)  Body mass index is 18.41 kg/m².    SpO2: 100 %  O2 Device (Oxygen Therapy): ventilator      Intake/Output Summary (Last 24 hours) at 7/5/2019 1330  Last data filed at 7/5/2019 1000  Gross per 24 hour   Intake 1508.82 ml   Output 1025 ml   Net 483.82 ml       Lines/Drains/Airways     Central Venous Catheter Line                 Percutaneous Central Line Insertion/Assessment - triple lumen  07/04/19 1030 right subclavian 1 day          Drain                 NG/OG Tube 07/04/19 0007 [place] sump 18 Fr. Right mouth 1 day         Chest Tube 07/05/19 0430 1 Left less than 1 day         Urethral Catheter 07/04/19 1500 less than 1 day          Airway                 Airway - Non-Surgical 07/04/19 0005 Endotracheal Tube 1 day          Arterial Line                 Arterial Line 07/04/19 0311 Right Radial 1 day          Peripheral Intravenous Line                 Peripheral IV - Single Lumen 07/04/19 0005 18 G Left Antecubital 1 day         Peripheral IV - Single Lumen 07/04/19 0005 18 G Right Forearm 1 day                Physical Exam   Constitutional: He is intubated.   HENT:   Head: Normocephalic and atraumatic.   Cardiovascular: Normal rate, regular rhythm and intact distal pulses.   Pulmonary/Chest: He is intubated.   Musculoskeletal: He exhibits no edema.   Neurological: He is unresponsive.   Skin: Skin is warm and dry.       Significant Labs:   All significant labs were reviewed.    Significant Imaging: Echocardiogram:   Transthoracic echo (TTE) complete (Cupid Only):  Demonstrated:  Moderate left ventricular enlargement.  · Severely decreased left ventricular systolic function. The estimated   ejection fraction is 15%  · Mildly to  moderately reduced right ventricular systolic function.  · Grade II (moderate) left ventricular diastolic dysfunction consistent   with pseudonormalization.  · Severe left atrial enlargement.  · Mild tricuspid regurgitation.  · Mild mitral regurgitation.  · 1.5cm mobile thrombus noted in the left ventricular apex.       Assessment and Plan:     NSTEMI (non-ST elevated myocardial infarction)  Patient remains comatose. We have a low index of suspicion for Type 1 myocardial infarction. Recommendations are as follows-  -Follow-up on transthoracic echo  -continue lasix and titrate as needed to CVP <8  -continue ASA and statin regimens      Elevated troponin  Initial troponins were 2 then increased to 18  EKG showed inferolateral STd/TWI suggestive of ischemia  Does not look overloaded on exam and CVP is WNL but initial BNP was > 4K  Got a total of 200 mg of lasix today but urine output documented is only 500s    Calculations on milrinone 0.125:  CVP is 4 now  SVO2 is 73 now  MAP 76  BSA 1.74 m2  CO 8.84  CI 5.08      Recommendations:  -Whenever/if deemed safe by primary team, consider starting the patient on clopidogrel (with loading dose of 300), heparin drip ACS protocol  -Continue ASA 81 mg and high intensity statin  -TTE in the AM  -Trend CVP and SVO2 to calculate hemodynamics  -Trend troponins and EKGs  -Consider discontinuing milrinone as his hemodynamics do not suggest cardiogenic shock          VTE Risk Mitigation (From admission, onward)        Ordered     Reason for No Pharmacological VTE Prophylaxis  Once      07/04/19 0015     IP VTE HIGH RISK PATIENT  Once      07/04/19 0015     Place sequential compression device  Until discontinued      07/04/19 0015          Thank you for your consult. I will sign off. Please contact us if you have any additional questions.    Dane Mobley MD  Cardiology   [facility]

## 2019-07-05 NOTE — SUBJECTIVE & OBJECTIVE
Neurologic Chief Complaint: Cerebral infarction throughout bilateral MCA and L YRIS distribution.    Subjective:     Interval History: Bilateral cortical ischemia CVA complicated by NSTEMI +/- cardiogenic shock, Acute renal failure, aspiration PNA, and L pneumothorax. Poor neuro exam.     HPI, Past Medical, Family, and Social History remains the same as documented in the initial encounter.     Review of Systems   Unable to perform ROS: Intubated     Scheduled Meds:   aspirin  81 mg Per NG tube Daily    cefTRIAXone (ROCEPHIN) IVPB  2 g Intravenous Q24H    And    metronidazole  500 mg Intravenous Q8H    chlorhexidine  15 mL Mouth/Throat BID    famotidine  20 mg Per OG tube Daily    senna-docusate 8.6-50 mg  1 tablet Per OG tube Daily     Continuous Infusions:  PRN Meds:fentaNYL, fentaNYL, pneumoc 13-quita conj-dip cr(PF), sodium chloride 0.9%, sodium chloride 0.9%    Objective:     Vital Signs (Most Recent):  Temp: 98.2 °F (36.8 °C) (07/05/19 0700)  Pulse: 76 (07/05/19 1000)  Resp: 16 (07/05/19 1000)  BP: 136/72 (07/05/19 1000)  SpO2: 100 % (07/05/19 1000)  BP Location: Right arm    Vital Signs Range (Last 24H):  Temp:  [98.2 °F (36.8 °C)-102 °F (38.9 °C)]   Pulse:  [74-97]   Resp:  [15-60]   BP: ()/(51-89)   SpO2:  [92 %-100 %]   Arterial Line BP: ()/(36-97)   BP Location: Right arm    Physical Exam   Constitutional: No distress.   Cardiovascular: Normal rate.   No murmur heard.  Pulmonary/Chest: No respiratory distress. He has rales.   Abdominal: He exhibits no distension.   Neurological: He is unresponsive.   Skin: Capillary refill takes 2 to 3 seconds. He is not diaphoretic.       Laboratory:  CMP:   Recent Labs   Lab 07/05/19  0209   CALCIUM 8.2*   ALBUMIN 2.4*   PROT 9.4*   *   K 4.8   CO2 14*   *   BUN 41*   CREATININE 2.8*   ALKPHOS 47*   ALT 1,222*   AST 2,273*   BILITOT 1.2*     CBC:   Recent Labs   Lab 07/05/19  0627   WBC 15.75*   RBC 2.84*   HGB 8.4*   HCT 25.0*      MCV  88   [facility] 29.6   [facility] 33.6       Diagnostic Results     Brain Imaging   07.04.2019 - MRI Brain - Large acute strokes throughout the bilateral MCA and left YRIS distributions.  No midline shift, herniation, hydrocephalus or hemorrhagic conversion.

## 2019-07-05 NOTE — SUBJECTIVE & OBJECTIVE
History reviewed. No pertinent past medical history.    No past surgical history on file.    Review of patient's allergies indicates:  No Known Allergies    No current facility-administered medications on file prior to encounter.      No current outpatient medications on file prior to encounter.     Family History     None        Tobacco Use    Smoking status: Unknown If Ever Smoked   Substance and Sexual Activity    Alcohol use: Not on file    Drug use: Not on file    Sexual activity: Not on file     Review of Systems   Unable to perform ROS: intubated     Objective:     Vital Signs (Most Recent):  Temp: 99.5 °F (37.5 °C) (07/04/19 1905)  Pulse: 85 (07/04/19 1905)  Resp: 20 (07/04/19 1905)  BP: (!) 120/56 (07/04/19 1905)  SpO2: 100 % (07/04/19 1905) Vital Signs (24h Range):  Temp:  [97.8 °F (36.6 °C)-102 °F (38.9 °C)] 99.5 °F (37.5 °C)  Pulse:  [] 85  Resp:  [16-41] 20  SpO2:  [75 %-100 %] 100 %  BP: ()/() 120/56  Arterial Line BP: ()/(40-60) 130/48     Weight: 60 kg (132 lb 4.4 oz)  Body mass index is 17.94 kg/m².    SpO2: 100 %  O2 Device (Oxygen Therapy): ventilator      Intake/Output Summary (Last 24 hours) at 7/4/2019 1938  Last data filed at 7/4/2019 1905  Gross per 24 hour   Intake 1151.1 ml   Output 920 ml   Net 231.1 ml       Lines/Drains/Airways     Central Venous Catheter Line                 Percutaneous Central Line Insertion/Assessment - triple lumen  07/04/19 1030 right subclavian less than 1 day          Drain                 NG/OG Tube 07/04/19 0007 Peever sump 18 Fr. Right mouth less than 1 day          Airway                 Airway - Non-Surgical 07/04/19 0005 Endotracheal Tube less than 1 day          Arterial Line                 Arterial Line 07/04/19 0311 Right Radial less than 1 day          Peripheral Intravenous Line                 Peripheral IV - Single Lumen 07/04/19 0005 18 G Left Antecubital less than 1 day         Peripheral IV - Single Lumen 07/04/19 0005  18 G Right Forearm less than 1 day                Physical Exam   Constitutional: He is oriented to person, place, and time. He appears well-developed and well-nourished.   HENT:   Head: Normocephalic and atraumatic.   Nose: Nose normal.   Mouth/Throat: No oropharyngeal exudate.   Eyes: Right eye exhibits no discharge. Left eye exhibits no discharge. No scleral icterus.   Neck: Normal range of motion. Neck supple. No JVD present.   Cardiovascular: Normal rate, regular rhythm, S1 normal and S2 normal. Exam reveals no gallop, no S3, no S4, no distant heart sounds, no friction rub, no midsystolic click and no opening snap.   No murmur heard.  Pulses:       Radial pulses are 2+ on the right side, and 2+ on the left side.        Femoral pulses are 2+ on the right side, and 2+ on the left side.  Pulmonary/Chest: No respiratory distress.   Intubated/sedated  Coarse breath sounds   Abdominal: Soft. Bowel sounds are normal. He exhibits no distension. There is no tenderness. There is no rebound.   Musculoskeletal: Normal range of motion. He exhibits no edema, tenderness or deformity.   Lymphadenopathy:     He has no cervical adenopathy.   Neurological: He is alert and oriented to person, place, and time. No cranial nerve deficit.   Skin: Skin is warm and dry.   Psychiatric: He has a normal mood and affect. His behavior is normal.       Significant Labs: All pertinent lab results from the last 24 hours have been reviewed.    Significant Imaging: All pertinent images from the last 24h have been reviewed.

## 2019-07-05 NOTE — ASSESSMENT & PLAN NOTE
Patient is a 74 yo male with PMHx of CAD and CHF p/w LMCA syndrome started one hour prior to tele-stroke, received tPA and transferred to [facility] for ICU evaluation. Patient got agitated on the way to [facility] which received Versed, upon arrival patient in respiratory distress, s/p intubation, CTA  revealed left carotid terminus occlusion.  - MRI with Large, acute CVA throughout bilateral MCA and L YRIS distribution. No shift, herniation, hydrocephalus, or hemorrhagic conversion noted.     Antithrombotics for secondary stroke prevention: Antiplatelets: ASA  Statins for secondary stroke prevention and hyperlipidemia, if present:   Statins: Atorvastatin- 40 mg daily    Aggressive risk factor modification: CAD     Rehab efforts: The patient has been evaluated by a stroke team provider and the therapy needs have been fully considered based off the presenting complaints and exam findings. The following therapy evaluations are needed: PT evaluate and treat, OT evaluate and treat, SLP evaluate and treat -- Further assessment pending extubation    Diagnostics ordered/pending: TTE to assess cardiac function/status    · Moderate left ventricular enlargement.  · Severely decreased left ventricular systolic function. The estimated ejection fraction is 15%  · Mildly to moderately reduced right ventricular systolic function.  · Grade II (moderate) left ventricular diastolic dysfunction consistent with pseudonormalization.  · Severe left atrial enlargement.  · Mild tricuspid regurgitation.  · Mild mitral regurgitation.  · 1.5cm mobile thrombus noted in the left ventricular apex.    VTE prophylaxis: None: Reason for No Pharmacological VTE Prophylaxis: Mechanical prophylaxis: Place SCDs    BP parameters: Infarct: Post sucessful thrombectomy, SBP <140

## 2019-07-06 PROBLEM — K72.00 SHOCK LIVER: Status: ACTIVE | Noted: 2019-01-01

## 2019-07-06 PROBLEM — G93.5 BRAINSTEM HERNIATION: Status: ACTIVE | Noted: 2019-01-01

## 2019-07-06 PROBLEM — R57.0 CARDIOGENIC SHOCK: Status: ACTIVE | Noted: 2019-01-01

## 2019-07-06 PROBLEM — R53.81 DEBILITY: Status: ACTIVE | Noted: 2019-01-01

## 2019-07-06 PROBLEM — Z71.89 GOALS OF CARE, COUNSELING/DISCUSSION: Status: ACTIVE | Noted: 2019-01-01

## 2019-07-06 PROBLEM — G93.5 BRAIN COMPRESSION: Status: ACTIVE | Noted: 2019-01-01

## 2019-07-06 PROBLEM — J93.9 PNEUMOTHORAX: Status: ACTIVE | Noted: 2019-01-01

## 2019-07-06 PROBLEM — R40.2430 GLASGOW COMA SCALE TOTAL SCORE 3-8: Status: ACTIVE | Noted: 2019-01-01

## 2019-07-06 NOTE — PLAN OF CARE
Problem: Adult Inpatient Plan of Care  Goal: Plan of Care Review  POC reviewed with pt and family at 0500. Pt unable to verbalize understanding but family verbalized understanding. Questions and concerns addressed. Right pupil greater than 1 mm difference, also right gaze preference noted. STAT ct was ordered. Pt tolerated trip well. 3% ordered at 30 mL. Will continue to monitor. See flowsheets for full assessment and VS info

## 2019-07-06 NOTE — NURSING
1410: Pt's L pupil now fixed per pupilometer. Gag reflex absent. Cough reflex very weak with repeated stimulation. SBP 190s-200. Dr. Dumont at bedside. Verbal order to maintain SBP < 220. 3% gtt remain infusing at 30 ml/hr. Family updated on pt's status at bedside by MD.

## 2019-07-06 NOTE — HOSPITAL COURSE
"7/6/2019- patient with poor prognosis and neuro exam. NCC to have family discussion about code status.     7/7/2019- Neuro exam and prognosis remains poor. CTH completed on 7/6 revealed interval development of diffuse cerebral edema and leftward midline shift . NCC had family discussion regarding "dismal prognosis and impending brain death". Family wishes to keep patient full code.     7/8/2019 - Worsening neuro exam. Unable to illicit reflexes.   7/9/2019 - Plans for brain death exam and apnea test   "

## 2019-07-06 NOTE — NURSING
Dr. Dumont notified pt's R pupil 5.85 mm (NPI 0) per pupilometer. No new orders at this time. Family meeting pending.

## 2019-07-06 NOTE — PLAN OF CARE
Interval update:     Patient admitted for bilateral cortical CVA, found to have depressed LVEF 15% with LV thrombus. Cardiology consulted for type II NSTEMI. Recommended ACS protocol, currently on ASA 81mg.     Eventful 24 hours, overnight with unequal pupils, CT head with 8mm MLS, started on 3% NS. This morning patient without cough reflex and triple flexing lower extremities.     Unfortunately very poor prognosis, remains full code. Recommend continued GOC discuss with family. Cardiology will follow peripherally, please call for any urgent questions or concerns. Thank you.       Galina Swan M.D.  Page # [phone]  Cardiovascular Fellow PGY-IV  [facility]

## 2019-07-06 NOTE — PROGRESS NOTES
[facility]  General Surgery  Progress Note    Subjective:     History of Present Illness:  No notes on file    Post-Op Info:  * No surgery found *         Interval History: NAEON. Pigtail catheter in place set to water seal. 80 cc out over 24 hours.    Medications:  Continuous Infusions:   buffered 3% sodium acetate 130meq, sodium chloride 130meq, sterile water for inj IV soln 30 mL/hr at 07/06/19 0902     Scheduled Meds:   aspirin  81 mg Per NG tube Daily    cefTRIAXone (ROCEPHIN) IVPB  2 g Intravenous Q24H    And    metronidazole  500 mg Intravenous Q8H    chlorhexidine  15 mL Mouth/Throat BID    famotidine  20 mg Per OG tube Daily    senna-docusate 8.6-50 mg  1 tablet Per OG tube Daily     PRN Meds:fentaNYL, pneumoc 13-quita conj-dip cr(PF), sodium chloride 0.9%, sodium chloride 0.9%     Review of patient's allergies indicates:  No Known Allergies  Objective:     Vital Signs (Most Recent):  Temp: 97.6 °F (36.4 °C) (07/06/19 0702)  Pulse: 64 (07/06/19 0918)  Resp: 10 (07/06/19 0918)  BP: (!) 171/77 (07/06/19 0902)  SpO2: 100 % (07/06/19 0918) Vital Signs (24h Range):  Temp:  [97.6 °F (36.4 °C)-98.9 °F (37.2 °C)] 97.6 °F (36.4 °C)  Pulse:  [61-92] 64  Resp:  [7-20] 10  SpO2:  [97 %-100 %] 100 %  BP: (131-171)/(66-88) 171/77  Arterial Line BP: (145-196)/(50-80) 175/63     Weight: 59.9 kg (132 lb)  Body mass index is 18.41 kg/m².    Intake/Output - Last 3 Shifts       07/04 0700 - 07/05 0659 07/05 0700 - 07/06 0659 07/06 0700 - 07/07 0659    I.V. (mL/kg) 600 (10) 192.5 (3.2) 103.5 (1.7)    Blood 1066      IV Piggyback 550 350     Total Intake(mL/kg) 2216 (36.9) 542.5 (9.1) 103.5 (1.7)    Urine (mL/kg/hr) 945 (0.7) 1245 (0.9) 168 (1)    Stool 0  0    Chest Tube  80     Total Output 945 1325 168    Net +1271 -782.5 -64.5           Stool Occurrence 0 x  0 x          Physical Exam   Constitutional: No distress.   Cardiovascular: Normal rate and regular rhythm.   Skin: Skin is warm and dry. He is  not diaphoretic.   Vitals reviewed.      Significant Labs:  CBC:   Recent Labs   Lab 07/06/19 0028   WBC 15.75*   RBC 3.26*   HGB 9.6*   HCT 29.0*      MCV 89   MCH 29.4   MCHC 33.1     BMP:   Recent Labs   Lab 07/06/19 0028 07/06/19  0536   *  --     137   K 4.6  --    *  --    CO2 17*  --    BUN 50*  --    CREATININE 2.8*  --    CALCIUM 8.4*  --    MG 2.2  --        Significant Diagnostics:  CXR: I have reviewed all pertinent results/findings within the past 24 hours.    Assessment/Plan:     Pneumothorax  - Cont pigtail catheter to waterseal    Fouzia Sinclair MD  General Surgery  [facility]

## 2019-07-06 NOTE — PROGRESS NOTES
Mobile thrombus found on tte  Chetan, shock liver  Now off pressors  ptx on cxr, pigtail placed  Awaiting arrival of daughter (decision maker)    Vital signs, lab studies, and imaging reviewed by me  Agitated, roving eom, cn intact, minimal motor exam to nox stim.   Extremities well perfused  No dependent edema  ett in place, on ac  Belly soft, nontender, no hepatosplenomegaly  Garsia in place with yellow urine    A/p  B/l cortical ischemic stroke in setting of lv thrombus, complicated by coma/debility. Poor prognosis  -permissive htn  -statin, asa, will need ac (high risk of bleed and repeat stroke)  -goc discussion w daughter needed    nstemi w inferolateral ischemia complicated by cardiogenic shock, chetan, and shock liver. Improved hemodynamics this am, trops however cont to rise.   -asa and statin    Aspiration. R ul opacity  -abx for community acquired aspiration pna (ctx and flagyl)    Iatrogenic ptx, hd stable, pigtail in place  -maintian pigtail today on suction  -daily cxr    I spent 30 min of uninterrupted critical care time caring for this critically ill patient at bedside titrating vasoactive gtts and reviewing frequent laboratory values exclusive of procedures and teaching.

## 2019-07-06 NOTE — SUBJECTIVE & OBJECTIVE
Neurologic Chief Complaint: Cerebral infarction throughout bilateral MCA and L YRIS distribution.    Subjective:     Interval History: Bilateral cortical ischemia CVA complicated by NSTEMI +/- cardiogenic shock, Acute renal failure, aspiration PNA, and L pneumothorax. Poor neuro exam.     HPI, Past Medical, Family, and Social History remains the same as documented in the initial encounter.     Review of Systems   Constitutional: Negative for fever.   HENT: Positive for drooling.    Eyes: Positive for visual disturbance.   Gastrointestinal: Negative for diarrhea and vomiting.   Genitourinary: Negative for difficulty urinating.   Neurological: Positive for speech difficulty, weakness and numbness.     Scheduled Meds:   aspirin  81 mg Per NG tube Daily    cefTRIAXone (ROCEPHIN) IVPB  2 g Intravenous Q24H    And    metronidazole  500 mg Intravenous Q8H    chlorhexidine  15 mL Mouth/Throat BID    famotidine  20 mg Per OG tube Daily    senna-docusate 8.6-50 mg  1 tablet Per OG tube Daily     Continuous Infusions:   buffered 3% sodium acetate 130meq, sodium chloride 130meq, sterile water for inj IV soln 30 mL/hr at 07/06/19 1802     PRN Meds:fentaNYL, pneumoc 13-quita conj-dip cr(PF), sodium chloride 0.9%, sodium chloride 0.9%    Objective:     Vital Signs (Most Recent):  Temp: 97.6 °F (36.4 °C) (07/06/19 1602)  Pulse: 85 (07/06/19 1707)  Resp: 13 (07/06/19 1707)  BP: (!) 185/93 (07/06/19 1707)  SpO2: 99 % (07/06/19 1707)  BP Location: Left arm    Vital Signs Range (Last 24H):  Temp:  [97.2 °F (36.2 °C)-98.1 °F (36.7 °C)]   Pulse:  [61-94]   Resp:  [7-13]   BP: (150-185)/(73-93)   SpO2:  [97 %-100 %]   Arterial Line BP: (157-210)/(58-77)   BP Location: Left arm    Physical Exam   HENT:   Head: Normocephalic and atraumatic.   Right Ear: External ear normal.   Left Ear: External ear normal.   Nose: Nose normal.   ETT in place   Eyes: Right conjunctiva is injected. Left conjunctiva is injected.   Cardiovascular: Normal  rate.   No murmur heard.  Abdominal: He exhibits no distension.   Neurological: He is unresponsive. GCS eye subscore is 1. GCS verbal subscore is 1. GCS motor subscore is 3.   Skin: Capillary refill takes 2 to 3 seconds. He is not diaphoretic.   Psychiatric: He is withdrawn.   Neurological Exam:   LOC: comatose   Attention Span: poor  Language: does not follow commands  Articulation: LAY intubated  Orientation: LAY intubated  Visual Fields: no BTT mac  Pupils (CN II, III):  R- fixed. L- sluggisj  Facial Sensation (CN V): R corneal reflex absent, L corneal reflex- depressed    Motor: No movement on upper extremities, slight triple flex on LE    No gag reflex  Delayed cough reflex    Laboratory:  CMP:   Recent Labs   Lab 07/06/19 0028 07/06/19  1758   CALCIUM 8.4*  --   --    ALBUMIN 2.3*  --   --    PROT 9.7*  --   --       < > 141   K 4.6  --   --    CO2 17*  --   --    *  --   --    BUN 50*  --   --    CREATININE 2.8*  --   --    ALKPHOS 55  --   --    ALT 1,197*  --   --    AST 1,261*  --   --    BILITOT 0.5  --   --     < > = values in this interval not displayed.     CBC:   Recent Labs   Lab 07/06/19 0028   WBC 15.75*   RBC 3.26*   HGB 9.6*   HCT 29.0*      MCV 89   MCH 29.4   MCHC 33.1       Diagnostic Results     Brain Imaging     CT head 7/6/2019  Evolving bilateral MCA and left YRIS distribution infarcts similar to recent MRI brain.    Interval development of diffuse cerebral edema and leftward midline shift measuring in the order of 8 mm.    Subarachnoid hemorrhage.    07.04.2019 - MRI Brain - Large acute strokes throughout the bilateral MCA and left YRIS distributions.  No midline shift, herniation, hydrocephalus or hemorrhagic conversion.    Vessel imaging       IR angio 07/04/2019  Left MCA M1 segment occlusion was noted.  This was treated with embolectomy using aspiration and 2 passes.  There was TICI 3 reperfusion.    Right M1 stump occlusion which is considered most likely to be  chronic with good leptomeningeal collateral flow to the MCA territory.  This is not treated, because the patient only presented with a left MCA syndrome      CTA stroke MP 7/4/2019    Noncontrast head CT demonstrates changes of acute infarction involving the left MCA territory including the insula, corona radiata and cortex of the lateral frontal and parietal lobes without hemorrhage.    Left ICA terminus and M1 segment occlusion which appears acute consistent with the patient's acute left MCA syndrome.    Right M1 segment occlusion with a 1.3 cm length of occlusion and distal flow by collaterals. This is likely chronically occluded.    High-grade stenosis of the V4 segment of the left vertebral artery.    Subsegmental ground-glass opacity of the right upper lobe with right-sided pleural effusion.

## 2019-07-06 NOTE — SUBJECTIVE & OBJECTIVE
Interval History: NAEON. Pigtail catheter in place set to suction.      Medications:  Continuous Infusions:   buffered 3% sodium acetate 130meq, sodium chloride 130meq, sterile water for inj IV soln 30 mL/hr at 07/06/19 0902     Scheduled Meds:   aspirin  81 mg Per NG tube Daily    cefTRIAXone (ROCEPHIN) IVPB  2 g Intravenous Q24H    And    metronidazole  500 mg Intravenous Q8H    chlorhexidine  15 mL Mouth/Throat BID    famotidine  20 mg Per OG tube Daily    senna-docusate 8.6-50 mg  1 tablet Per OG tube Daily     PRN Meds:fentaNYL, pneumoc 13-quita conj-dip cr(PF), sodium chloride 0.9%, sodium chloride 0.9%     Review of patient's allergies indicates:  No Known Allergies  Objective:     Vital Signs (Most Recent):  Temp: 97.6 °F (36.4 °C) (07/06/19 0702)  Pulse: 64 (07/06/19 0918)  Resp: 10 (07/06/19 0918)  BP: (!) 171/77 (07/06/19 0902)  SpO2: 100 % (07/06/19 0918) Vital Signs (24h Range):  Temp:  [97.6 °F (36.4 °C)-98.9 °F (37.2 °C)] 97.6 °F (36.4 °C)  Pulse:  [61-92] 64  Resp:  [7-20] 10  SpO2:  [97 %-100 %] 100 %  BP: (131-171)/(66-88) 171/77  Arterial Line BP: (145-196)/(50-80) 175/63     Weight: 59.9 kg (132 lb)  Body mass index is 18.41 kg/m².    Intake/Output - Last 3 Shifts       07/04 0700 - 07/05 0659 07/05 0700 - 07/06 0659 07/06 0700 - 07/07 0659    I.V. (mL/kg) 600 (10) 192.5 (3.2) 103.5 (1.7)    Blood 1066      IV Piggyback 550 350     Total Intake(mL/kg) 2216 (36.9) 542.5 (9.1) 103.5 (1.7)    Urine (mL/kg/hr) 945 (0.7) 1245 (0.9) 168 (1)    Stool 0  0    Chest Tube  80     Total Output 945 1325 168    Net +1271 -782.5 -64.5           Stool Occurrence 0 x  0 x          Physical Exam   Constitutional: No distress.   Cardiovascular: Normal rate and regular rhythm.   Skin: Skin is warm and dry. He is not diaphoretic.   Vitals reviewed.      Significant Labs:  CBC:   Recent Labs   Lab 07/06/19  0028   WBC 15.75*   RBC 3.26*   HGB 9.6*   HCT 29.0*      MCV 89   MCH 29.4   MCHC 33.1     BMP:    Recent Labs   Lab 07/06/19  0028 07/06/19  0536   *  --     137   K 4.6  --    *  --    CO2 17*  --    BUN 50*  --    CREATININE 2.8*  --    CALCIUM 8.4*  --    MG 2.2  --        Significant Diagnostics:  CXR: I have reviewed all pertinent results/findings within the past 24 hours.

## 2019-07-06 NOTE — NURSING
----- Message from Rudy Gupta OT sent at 7/27/2018  8:04 PM CDT -----  Radha Wilson,  I had recommended to Corie Sampson that she return in the fall for a follow-up visit to see if she is ready to be measured for new custom-fit compression stockings.  I see that s MERI Ying notified pt no longer has a cough reflex, and pt is now triple flexing in the lower extremities when previously withdrawing. No new orders at this time.     JET to be notified at TOD.

## 2019-07-06 NOTE — PLAN OF CARE
Problem: Adult Inpatient Plan of Care  Goal: Plan of Care Review  POC reviewed with pt and family at 1400. Pt's daughter and son participated in family meeting with Julia MONTES today. Family verbalized understanding. Questions and concerns addressed. Will continue to monitor. See previous notes for changes in pt's neuro assessment. See flowsheets for full assessment and VS info.

## 2019-07-06 NOTE — NURSING
MERI Peterson notified pt's pupils R 6.03 mm (NPI 0.4) L 3.06 mm (NPI 2.7) per pupilometer. No new orders at this time. VSS.

## 2019-07-07 NOTE — PROGRESS NOTES
"[facility]  Vascular Neurology  Comprehensive Stroke Center  Progress Note    Assessment/Plan:     * Cerebral infarction due to bilateral embolism of middle cerebral arteries  Patient is a 74 yo male with PMHx of CAD and CHF p/w LMCA syndrome started one hour prior to tele-stroke, received tPA and [place] [facility] [place]. Patient got agitated on the way to [facility] which received Versed, upon arrival patient in respiratory distress, s/p intubation, CTA  revealed left carotid terminus occlusion.  - MRI on 7/4 with Large, acute CVA throughout bilateral MCA and L YRIS distribution. No shift, herniation, hydrocephalus, or hemorrhagic conversion noted.     CTH on 7/6 with Evolving bilateral MCA and left YRIS distribution infarcts similar to recent MRI brain. Interval development of diffuse cerebral edema and leftward midline shift measuring in the order of 8 mm and subarachnoid hemorrhage.    NCC had family discussion regarding "dismal prognosis and impending brain death". Family wishes to keep patient full code.     Antithrombotics for secondary stroke prevention: Antiplatelets: ASA  Statins for secondary stroke prevention and hyperlipidemia, if present:   Statins: None: Reason: discontinued d/t elevated liver enzymes    Aggressive risk factor modification: CAD     Rehab efforts: The patient has been evaluated by a stroke team provider and the therapy needs have been fully considered based off the presenting complaints and exam findings. The following therapy evaluations are needed: PT evaluate and treat, OT evaluate and treat, SLP evaluate and treat -- Further assessment pending extubation    · Diagnostics ordered/pending: none    VTE prophylaxis: None: Reason for No Pharmacological VTE Prophylaxis: Mechanical prophylaxis: Place SCDs    BP parameters: Infarct: Post sucessful thrombectomy, SBP <140        Pneumothorax  S/p chest tube      Acute renal failure  -- Management per " "NCC    NSTEMI (non-ST elevated myocardial infarction)  Management per NCC/Cardiology    Acute respiratory failure with hypercapnia  -- Management per NCC  -- Now with L Pneuomothorax - chest tube in place  -- On CFX/Flagyl for presumed aspiration PNA    Cytotoxic brain edema  Area of cytotoxic cerebral edema identified when reviewing brain imaging in the territory of the bilateral middle cerebral arteries. There is mass effect associated with it. We will continue to monitor the patients clinical exam for any worsening of symptoms which may indicate expansion of the stroke or the area of the edema resulting in the clinical change. The pattern is suggestive of emobolic etiology.    CTH 7/6 revealed increased edema, impending herniation         7/6/2019- patient with poor prognosis and neuro exam. NCC to have family discussion about code status.     7/7/2019- Neuro exam and prognosis remains poor. CTH completed on 7/6 revealed interval development of diffuse cerebral edema and leftward midline shift . NCC had family discussion regarding "dismal prognosis and impending brain death". Family wishes to keep patient full code.     STROKE DOCUMENTATION   Acute Stroke Times   Last Known Normal Date: 07/03/19  Last Known Normal Time: 1830  Symptom Onset Date: 07/03/19  Stroke Team Arrival Date: 07/03/19  Stroke Team Arrival Time: 1924  CT Interpretation Time: 1932  Decision to Treat Time for Alteplase: 1940  Decision to Treat Time for IR: 0032    NIH Scale:  1a. Level of Consciousness: 3-->Responds only with reflex motor or autonomic effects or totally unresponsive, flaccid, and areflexic  1b. LOC Questions: 2-->Answers neither question correctly  1c. LOC Commands: 2-->Performs neither task correctly  2. Best Gaze: 2-->Forced deviation, or total gaze paresis not overcome by the oculocephalic maneuver  3. Visual: 3-->Bilateral hemianopia (blind including cortical blindness)  4. Facial Palsy: 0-->Normal symmetrical " "movements  5a. Motor Arm, Left: 3-->No effort against gravity, limb falls  5b. Motor Arm, Right: 3-->No effort against gravity, limb falls  6a. Motor Leg, Left: 3-->No effort against gravity, leg falls to bed immediately  6b. Motor Leg, Right: 3-->No effort against gravity, leg falls to bed immediately  7. Limb Ataxia: 0-->Absent  8. Sensory: 2-->Severe to total sensory loss, patient is not aware of being touched in the face, arm, and leg  9. Best Language: 3-->Mute, global aphasia, no usable speech or auditory comprehension  10. Dysarthria: (UN) Intubated or other physical barrier  11. Extinction and Inattention (formerly Neglect): 2-->Profound sydnee-inattention/extinction more than 1 modality  Total (NIH Stroke Scale): 31       Modified Benny    Maritza Coma Scale:    ABCD2 Score:    [id number] Score:   HAS -BLED Score:   ICH Score:   Hunt & Kaye Classification:      Hemorrhagic change of an Ischemic Stroke: Does this patient have an ischemic stroke with hemorrhagic changes? No     Neurologic Chief Complaint: Cerebral infarction throughout bilateral MCA and L YRIS distribution.    Subjective:     Interval History:  Neuro exam and prognosis remains poor. Both pupils are now fixed. No cough reflex, no gag reflex, and no corneal reflex present. CTH completed on 7/6 revealed interval development of diffuse cerebral edema and leftward midline shift . NCC had family discussion regarding "dismal prognosis and impending brain death". Family wishes to keep patient full code.     HPI, Past Medical, Family, and Social History remains the same as documented in the initial encounter.     Review of Systems   Constitutional: Negative for fever.   HENT: Positive for drooling.    Eyes: Positive for visual disturbance.   Gastrointestinal: Negative for diarrhea and vomiting.   Genitourinary: Negative for difficulty urinating.   Neurological: Positive for speech difficulty, weakness and numbness.     Scheduled Meds:   sodium " chloride 0.9%  2,000 mL Intravenous Once    aspirin  81 mg Per NG tube Daily    cefTRIAXone (ROCEPHIN) IVPB  2 g Intravenous Q24H    And    metronidazole  500 mg Intravenous Q8H    chlorhexidine  15 mL Mouth/Throat BID    famotidine  20 mg Per OG tube Daily    senna-docusate 8.6-50 mg  1 tablet Per OG tube Daily     Continuous Infusions:    PRN Meds:fentaNYL, pneumoc 13-quita conj-dip cr(PF), sodium chloride 0.9%, sodium chloride 0.9%    Objective:     Vital Signs (Most Recent):  Temp: 97.6 °F (36.4 °C) (07/07/19 1102)  Pulse: 75 (07/07/19 1202)  Resp: 17 (07/07/19 1202)  BP: 117/64 (07/07/19 1202)  SpO2: 100 % (07/07/19 1202)  BP Location: Left arm    Vital Signs Range (Last 24H):  Temp:  [97 °F (36.1 °C)-101.1 °F (38.4 °C)]   Pulse:  []   Resp:  [0-17]   BP: (108-189)/(57-93)   SpO2:  [96 %-100 %]   Arterial Line BP: (108-213)/(43-77)   BP Location: Left arm    Physical Exam   HENT:   Head: Normocephalic and atraumatic.   Right Ear: External ear normal.   Left Ear: External ear normal.   Nose: Nose normal.   ETT in place   Eyes: Right conjunctiva is injected. Left conjunctiva is injected.   Cardiovascular: Normal rate.   No murmur heard.  Abdominal: He exhibits no distension.   Neurological: He is unresponsive. GCS eye subscore is 1. GCS verbal subscore is 1. GCS motor subscore is 3.   Skin: Capillary refill takes 2 to 3 seconds. He is not diaphoretic.   Psychiatric: He is withdrawn.   Neurological Exam:   LOC: comatose   Attention Span: poor  Language: does not follow commands  Articulation: LAY intubated  Orientation: LAY intubated  Visual Fields: no BTT mac  Pupils (CN II, III):  R- fixed. L- fixed  Facial Sensation (CN V): corneal reflex absent bilat  Motor: extensor posturing on upper extremities, slight triple flex on LE    No gag reflex  No cough reflex    Laboratory:  CMP:   Recent Labs   Lab 07/07/19  0055 07/07/19  0443   CALCIUM 8.4*  --    ALBUMIN 2.2*  --    PROT 9.7*  --    * 152*   K  4.0  --    CO2 22*  --    *  --    BUN 46*  --    CREATININE 2.5*  --    ALKPHOS 65  --    ALT 1,279*  --    AST 1,430*  --    BILITOT 0.5  --      CBC:   Recent Labs   Lab 07/07/19  0055   WBC 10.82   RBC 3.87*   HGB 11.4*   HCT 35.4*      MCV 92   MCH 29.5   MCHC 32.2       Diagnostic Results     Brain Imaging     CT head 7/6/2019  Evolving bilateral MCA and left YRIS distribution infarcts similar to recent MRI brain.    Interval development of diffuse cerebral edema and leftward midline shift measuring in the order of 8 mm.    Subarachnoid hemorrhage.    07.04.2019 - MRI Brain - Large acute strokes throughout the bilateral MCA and left YRIS distributions.  No midline shift, herniation, hydrocephalus or hemorrhagic conversion.    Vessel imaging       IR angio 07/04/2019  Left MCA M1 segment occlusion was noted.  This was treated with embolectomy using aspiration and 2 passes.  There was TICI 3 reperfusion.    Right M1 stump occlusion which is considered most likely to be chronic with good leptomeningeal collateral flow to the MCA territory.  This is not treated, because the patient only presented with a left MCA syndrome      CTA stroke MP 7/4/2019    Noncontrast head CT demonstrates changes of acute infarction involving the left MCA territory including the insula, corona radiata and cortex of the lateral frontal and parietal lobes without hemorrhage.    Left ICA terminus and M1 segment occlusion which appears acute consistent with the patient's acute left MCA syndrome.    Right M1 segment occlusion with a 1.3 cm length of occlusion and distal flow by collaterals. This is likely chronically occluded.    High-grade stenosis of the V4 segment of the left vertebral artery.    Subsegmental ground-glass opacity of the right upper lobe with right-sided pleural effusion.          Andreas Shafer, MERI  Comprehensive Stroke Center  Department of Vascular Neurology   [facility]

## 2019-07-07 NOTE — NURSING
Pt's A-line diastolic's (low 40s) not correlating with cuff pressure diastolics (high 50s). Verbal from MD Julia to go off pt's cuff pressure.

## 2019-07-07 NOTE — NURSING
Message sent to pharmacy about missing 2% gtt. Gtt had to be stopped due to empty bag. Pharmacy also called and new bag is being dispensed. NCC notified.

## 2019-07-07 NOTE — SUBJECTIVE & OBJECTIVE
"Neurologic Chief Complaint: Cerebral infarction throughout bilateral MCA and L YRIS distribution.    Subjective:     Interval History:  Neuro exam and prognosis remains poor. Both pupils are now fixed. No cough reflex, no gag reflex, and no corneal reflex present. CTH completed on 7/6 revealed interval development of diffuse cerebral edema and leftward midline shift . NCC had family discussion regarding "dismal prognosis and impending brain death". Family wishes to keep patient full code.     HPI, Past Medical, Family, and Social History remains the same as documented in the initial encounter.     Review of Systems   Constitutional: Negative for fever.   HENT: Positive for drooling.    Eyes: Positive for visual disturbance.   Gastrointestinal: Negative for diarrhea and vomiting.   Genitourinary: Negative for difficulty urinating.   Neurological: Positive for speech difficulty, weakness and numbness.     Scheduled Meds:   sodium chloride 0.9%  2,000 mL Intravenous Once    aspirin  81 mg Per NG tube Daily    cefTRIAXone (ROCEPHIN) IVPB  2 g Intravenous Q24H    And    metronidazole  500 mg Intravenous Q8H    chlorhexidine  15 mL Mouth/Throat BID    famotidine  20 mg Per OG tube Daily    senna-docusate 8.6-50 mg  1 tablet Per OG tube Daily     Continuous Infusions:    PRN Meds:fentaNYL, pneumoc 13-quita conj-dip cr(PF), sodium chloride 0.9%, sodium chloride 0.9%    Objective:     Vital Signs (Most Recent):  Temp: 97.6 °F (36.4 °C) (07/07/19 1102)  Pulse: 75 (07/07/19 1202)  Resp: 17 (07/07/19 1202)  BP: 117/64 (07/07/19 1202)  SpO2: 100 % (07/07/19 1202)  BP Location: Left arm    Vital Signs Range (Last 24H):  Temp:  [97 °F (36.1 °C)-101.1 °F (38.4 °C)]   Pulse:  []   Resp:  [0-17]   BP: (108-189)/(57-93)   SpO2:  [96 %-100 %]   Arterial Line BP: (108-213)/(43-77)   BP Location: Left arm    Physical Exam   HENT:   Head: Normocephalic and atraumatic.   Right Ear: External ear normal.   Left Ear: External ear " normal.   Nose: Nose normal.   ETT in place   Eyes: Right conjunctiva is injected. Left conjunctiva is injected.   Cardiovascular: Normal rate.   No murmur heard.  Abdominal: He exhibits no distension.   Neurological: He is unresponsive. GCS eye subscore is 1. GCS verbal subscore is 1. GCS motor subscore is 3.   Skin: Capillary refill takes 2 to 3 seconds. He is not diaphoretic.   Psychiatric: He is withdrawn.   Neurological Exam:   LOC: comatose   Attention Span: poor  Language: does not follow commands  Articulation: LAY intubated  Orientation: LAY intubated  Visual Fields: no BTT mac  Pupils (CN II, III):  R- fixed. L- fixed  Facial Sensation (CN V): corneal reflex absent bilat  Motor: extensor posturing on upper extremities, slight triple flex on LE    No gag reflex  No cough reflex    Laboratory:  CMP:   Recent Labs   Lab 07/07/19 0055 07/07/19 0443   CALCIUM 8.4*  --    ALBUMIN 2.2*  --    PROT 9.7*  --    * 152*   K 4.0  --    CO2 22*  --    *  --    BUN 46*  --    CREATININE 2.5*  --    ALKPHOS 65  --    ALT 1,279*  --    AST 1,430*  --    BILITOT 0.5  --      CBC:   Recent Labs   Lab 07/07/19 0055   WBC 10.82   RBC 3.87*   HGB 11.4*   HCT 35.4*      MCV 92   MCH 29.5   MCHC 32.2       Diagnostic Results     Brain Imaging     CT head 7/6/2019  Evolving bilateral MCA and left YRIS distribution infarcts similar to recent MRI brain.    Interval development of diffuse cerebral edema and leftward midline shift measuring in the order of 8 mm.    Subarachnoid hemorrhage.    07.04.2019 - MRI Brain - Large acute strokes throughout the bilateral MCA and left YRIS distributions.  No midline shift, herniation, hydrocephalus or hemorrhagic conversion.    Vessel imaging       IR angio 07/04/2019  Left MCA M1 segment occlusion was noted.  This was treated with embolectomy using aspiration and 2 passes.  There was TICI 3 reperfusion.    Right M1 stump occlusion which is considered most likely to be  chronic with good leptomeningeal collateral flow to the MCA territory.  This is not treated, because the patient only presented with a left MCA syndrome      CTA stroke MP 7/4/2019    Noncontrast head CT demonstrates changes of acute infarction involving the left MCA territory including the insula, corona radiata and cortex of the lateral frontal and parietal lobes without hemorrhage.    Left ICA terminus and M1 segment occlusion which appears acute consistent with the patient's acute left MCA syndrome.    Right M1 segment occlusion with a 1.3 cm length of occlusion and distal flow by collaterals. This is likely chronically occluded.    High-grade stenosis of the V4 segment of the left vertebral artery.    Subsegmental ground-glass opacity of the right upper lobe with right-sided pleural effusion.

## 2019-07-07 NOTE — NURSING
NCC notified of urine >250 mL hourly for three hours. Ordered 250 bolus. Will continue to monitor.

## 2019-07-07 NOTE — ASSESSMENT & PLAN NOTE
Area of cytotoxic cerebral edema identified when reviewing brain imaging in the territory of the bilateral middle cerebral arteries. There is mass effect associated with it. We will continue to monitor the patients clinical exam for any worsening of symptoms which may indicate expansion of the stroke or the area of the edema resulting in the clinical change. The pattern is suggestive of emobolic etiology.

## 2019-07-07 NOTE — ASSESSMENT & PLAN NOTE
Area of cytotoxic cerebral edema identified when reviewing brain imaging in the territory of the bilateral middle cerebral arteries. There is mass effect associated with it. We will continue to monitor the patients clinical exam for any worsening of symptoms which may indicate expansion of the stroke or the area of the edema resulting in the clinical change. The pattern is suggestive of emobolic etiology.    CTH 7/6 revealed increased edema, impending herniation

## 2019-07-07 NOTE — NURSING
MERI Ying notified pt no longer has L corneal reflex. Neuro exam unchanged otherwise. NP also made aware .

## 2019-07-07 NOTE — PROGRESS NOTES
Worsening exam this am w increased cerebral edema and brain compression/herniation on ct  Started on hts overnight  Shock liver and trops now down trending    Vital signs, lab studies, and imaging reviewed by me  Agitated, roving eom, anisocoria w unreactive pupils, corneals+, cough weak, no motor response to central pain  Extremities well perfused  No dependent edema  ett in place, on ac  Belly soft, nontender, no hepatosplenomegaly  Garsia in place with yellow urine    A/p  B/l cortical ischemic stroke in setting of lv thrombus, complicated by coma/debility, today with increased cytotoxic cerebral edema and brain compression with brainstem herniation. Poor prognosis  -permissive htn, hts  -statin, asa, escalating to ac is futile in setting of devastating neurologic injury  -goc discussion w 2 sons and daughter today, informed of dismal prognosis and impending brain death, they wish to talk to other family and maintain full code today, will continue to address daily     nstemi w inferolateral ischemia complicated by cardiogenic shock, sung, and shock liver. Improving today.   -asa and statin    Aspiration. R ul opacity  -abx for community acquired aspiration pna (ctx and flagyl)    Iatrogenic ptx, hd stable, pigtail in place  -maintian pigtail today on suction  -daily cxr    I spent 72 min of uninterrupted critical care time caring for this critically ill patient and discussing goals of care with surrogate decision makers exclusive of procedures and teaching.

## 2019-07-07 NOTE — ASSESSMENT & PLAN NOTE
Patient is a 74 yo male with PMHx of CAD and CHF p/w LMCA syndrome started one hour prior to tele-stroke, received tPA and transferred to [facility] for ICU evaluation. Patient got agitated on the way to [facility] which received Versed, upon arrival patient in respiratory distress, s/p intubation, CTA  revealed left carotid terminus occlusion.  - MRI on 7/4 with Large, acute CVA throughout bilateral MCA and L YRIS distribution. No shift, herniation, hydrocephalus, or hemorrhagic conversion noted.     CTH on 7/6 with Evolving bilateral MCA and left YRIS distribution infarcts similar to recent MRI brain. Interval development of diffuse cerebral edema and leftward midline shift measuring in the order of 8 mm and subarachnoid hemorrhage.  Patient with poor prognosis and neuro exam. NCC to have family discussion about code status.     Antithrombotics for secondary stroke prevention: Antiplatelets: ASA  Statins for secondary stroke prevention and hyperlipidemia, if present:   Statins: None: Reason: discontinued d/t elevated liver enzymes    Aggressive risk factor modification: CAD     Rehab efforts: The patient has been evaluated by a stroke team provider and the therapy needs have been fully considered based off the presenting complaints and exam findings. The following therapy evaluations are needed: PT evaluate and treat, OT evaluate and treat, SLP evaluate and treat -- Further assessment pending extubation    · Diagnostics ordered/pending: none    VTE prophylaxis: None: Reason for No Pharmacological VTE Prophylaxis: Mechanical prophylaxis: Place SCDs    BP parameters: Infarct: Post sucessful thrombectomy, SBP <140

## 2019-07-07 NOTE — PLAN OF CARE
Problem: Occupational Therapy Goal  Goal: Occupational Therapy Goal  Goals set 7/5 to be addressed for 14 days with expiration date, 7/19:  Patient will increase functional independence with ADLs by performing:    Patient will demonstrate rolling to the right with max assist.  Not met   Patient will demonstrate rolling to the left with max assist.   Not met  Patient will demonstrate supine -sit with max  assist.   Not met  Patient will demonstrate squat pivot transfers with max assist.   Not met  Patient will demonstrate grooming while seated with max assist.   Not met  Patient will demonstrate upper body dressing with max assist while seated EOB.   Not met  Patient will demonstrate lower body dressing with max assist while seated EOB.   Not met  Patient will demonstrate toileting with max assist.   Not met  Patient's family / caregiver will demonstrate independence and safety with assisting patient with self-care skills and functional mobility.     Not met  Patient's family / caregiver will demonstrate independence with providing ROM and changes in bed positioning.   Not met  Patient and/or patient's family will verbalize understanding of stroke prevention guidelines, personal risk factors and stroke warning signs via teachback method.  Not met          Goals remain appropriate.  ANA Griffiths  7/7/2019

## 2019-07-07 NOTE — PT/OT/SLP PROGRESS
Occupational Therapy   Treatment    Name: Tevin Cristobal  MRN: [id number]  Admitting Diagnosis:  Cerebral infarction due to bilateral embolism of middle cerebral arteries       Recommendations:     Discharge Recommendations: (pending extubation)  Discharge Equipment Recommendations:  hospital bed  Barriers to discharge:  None    Assessment:     Tevin Cristobal is a 73 y.o. male with a medical diagnosis of Cerebral infarction due to bilateral embolism of middle cerebral arteries.  He presents with performance deficits affecting function are weakness, impaired self care skills, impaired functional mobilty, gait instability, impaired balance, impaired cognition, decreased coordination, visual deficits, decreased lower extremity function, decreased upper extremity function, decreased safety awareness, abnormal tone, decreased ROM, impaired fine motor, impaired coordination, impaired cardiopulmonary response to activity, impaired endurance, impaired sensation.     Rehab Prognosis:  Good; patient would benefit from acute skilled OT services to address these deficits and reach maximum level of function.       Plan:     Patient to be seen 2 x/week to address the above listed problems via self-care/home management, therapeutic exercises, therapeutic activities, neuromuscular re-education, cognitive retraining, sensory integration  · Plan of Care Expires: 08/02/19  · Plan of Care Reviewed with: patient    Subjective     Pain/Comfort:  · Pain Rating 1: 0/10  · Pain Rating Post-Intervention 1: 0/10    Objective:     Communicated with: Nurse prior to session.  Patient found supine with arterial line, bed alarm, blood pressure cuff, central line, akers catheter, pressure relief boots, peripheral IV, pulse ox (continuous), restraints, SCD, telemetry, ventilator, chest tube(OG tube) upon OT entry to room.    General Precautions: Standard, aspiration, fall, NPO   Orthopedic Precautions:N/A   Braces: N/A     Occupational Performance:      Bed Mobility:    · Patient completed Rolling/Turning to Left with  dependent  · Patient completed Rolling/Turning to Right with dependent     Functional Mobility/Transfers:  · Dependent drawsheet transfers    Activities of Daily Living:  · Feeding:  NPO    · Grooming: total assistance while supine      [facility] 6 Click ADL: 6    Treatment & Education:  PROM performed bilateral UE/LEs one set x 10 rep in all planes of motion with stretches provided at end range; sustained stretch provided for ankle dorsiflexion.  Assistance and facilitation provided for upward rotation of the scapula during shoulder flexion and abduction.  Patient kept eyes closed 100% of the session.  Patient unable to follow commands.  Positioning provided for midline orientation with bilateral UEs elevated and heels lifted off mattress.  Gentle cervical rotation provided.   White board updated in patient's room.       Patient left supine with all lines intact, call button in reach and bed alarm onEducation:      GOALS:   Multidisciplinary Problems     Occupational Therapy Goals        Problem: Occupational Therapy Goal    Goal Priority Disciplines Outcome Interventions   Occupational Therapy Goal     OT, PT/OT     Description:  Goals set 7/5 to be addressed for 14 days with expiration date, 7/19:  Patient will increase functional independence with ADLs by performing:    Patient will demonstrate rolling to the right with max assist.  Not met   Patient will demonstrate rolling to the left with max assist.   Not met  Patient will demonstrate supine -sit with max  assist.   Not met  Patient will demonstrate squat pivot transfers with max assist.   Not met  Patient will demonstrate grooming while seated with max assist.   Not met  Patient will demonstrate upper body dressing with max assist while seated EOB.   Not met  Patient will demonstrate lower body dressing with max assist while seated EOB.   Not met  Patient will demonstrate toileting with max  assist.   Not met  Patient's family / caregiver will demonstrate independence and safety with assisting patient with self-care skills and functional mobility.     Not met  Patient's family / caregiver will demonstrate independence with providing ROM and changes in bed positioning.   Not met  Patient and/or patient's family will verbalize understanding of stroke prevention guidelines, personal risk factors and stroke warning signs via teachback method.  Not met                           Time Tracking:     OT Date of Treatment: 07/07/19  OT Start Time: 0502  OT Stop Time: 0512  OT Total Time (min): 10 min    Billable Minutes:Therapeutic Exercise 10    ANA Griffiths  7/7/2019

## 2019-07-07 NOTE — PROGRESS NOTES
[facility]  Vascular Neurology  Comprehensive Stroke Center  Progress Note    Assessment/Plan:     * Cerebral infarction due to bilateral embolism of middle cerebral arteries  Patient is a 72 yo male with PMHx of CAD and CHF p/w LMCA syndrome started one hour prior to tele-stroke, received tPA [place] [facility] [place]. Patient got agitated on the way to [facility] which received Versed, upon arrival patient in respiratory distress, s/p intubation, CTA  revealed left carotid terminus occlusion.  - MRI on 7/4 with Large, acute CVA throughout bilateral MCA and L YRIS distribution. No shift, herniation, hydrocephalus, or hemorrhagic conversion noted.     CTH on 7/6 with Evolving bilateral MCA and left YRIS distribution infarcts similar to recent MRI brain. Interval development of diffuse cerebral edema and leftward midline shift measuring in the order of 8 mm and subarachnoid hemorrhage.  Patient with poor prognosis and neuro exam. NCC to have family discussion about code status.     Antithrombotics for secondary stroke prevention: Antiplatelets: ASA  Statins for secondary stroke prevention and hyperlipidemia, if present:   Statins: None: Reason: discontinued d/t elevated liver enzymes    Aggressive risk factor modification: CAD     Rehab efforts: The patient has been evaluated by a stroke team provider and the therapy needs have been fully considered based off the presenting complaints and exam findings. The following therapy evaluations are needed: PT evaluate and treat, OT evaluate and treat, SLP evaluate and treat -- Further assessment pending extubation    · Diagnostics ordered/pending: none    VTE prophylaxis: None: Reason for No Pharmacological VTE Prophylaxis: Mechanical prophylaxis: Place SCDs    BP parameters: Infarct: Post sucessful thrombectomy, SBP <140        Pneumothorax  S/p chest tube      Acute renal failure  -- Management per NCC    NSTEMI (non-ST elevated myocardial  infarction)  Management per NCC/Cardiology    Acute respiratory failure with hypercapnia  -- Management per NCC  -- Now with L Pneuomothorax - chest tube in place  -- On CFX/Flagyl for presumed aspiration PNA    Cytotoxic brain edema  Area of cytotoxic cerebral edema identified when reviewing brain imaging in the territory of the bilateral middle cerebral arteries. There is mass effect associated with it. We will continue to monitor the patients clinical exam for any worsening of symptoms which may indicate expansion of the stroke or the area of the edema resulting in the clinical change. The pattern is suggestive of emobolic etiology.           7/6/2019- patient with poor prognosis and neuro exam. NCC to have family discussion about code status.     STROKE DOCUMENTATION   Acute Stroke Times   Last Known Normal Date: 07/03/19  Last Known Normal Time: 1830  Symptom Onset Date: 07/03/19  Stroke Team Arrival Date: 07/03/19  Stroke Team Arrival Time: 1924  CT Interpretation Time: 1932  Decision to Treat Time for Alteplase: 1940  Decision to Treat Time for IR: 0032    NIH Scale:  1a. Level of Consciousness: 3-->Responds only with reflex motor or autonomic effects or totally unresponsive, flaccid, and areflexic  1b. LOC Questions: 2-->Answers neither question correctly  1c. LOC Commands: 2-->Performs neither task correctly  2. Best Gaze: 2-->Forced deviation, or total gaze paresis not overcome by the oculocephalic maneuver  3. Visual: 3-->Bilateral hemianopia (blind including cortical blindness)  4. Facial Palsy: 0-->Normal symmetrical movements  5a. Motor Arm, Left: 3-->No effort against gravity, limb falls  5b. Motor Arm, Right: 3-->No effort against gravity, limb falls  6a. Motor Leg, Left: 3-->No effort against gravity, leg falls to bed immediately  6b. Motor Leg, Right: 3-->No effort against gravity, leg falls to bed immediately  7. Limb Ataxia: 0-->Absent  8. Sensory: 2-->Severe to total sensory loss, patient is  not aware of being touched in the face, arm, and leg  9. Best Language: 3-->Mute, global aphasia, no usable speech or auditory comprehension  10. Dysarthria: (UN) Intubated or other physical barrier  11. Extinction and Inattention (formerly Neglect): 2-->Profound sydnee-inattention/extinction more than 1 modality  Total (NIH Stroke Scale): 31       Modified Benny  0  [place] Coma Scale:5   ABCD2 Score:    [id number] Score:   HAS -BLED Score:   ICH Score:   Hunt & Kaye Classification:      Hemorrhagic change of an Ischemic Stroke: Does this patient have an ischemic stroke with hemorrhagic changes? No     Neurologic Chief Complaint: Cerebral infarction throughout bilateral MCA and L YRIS distribution.    Subjective:     Interval History: Bilateral cortical ischemia CVA complicated by NSTEMI +/- cardiogenic shock, Acute renal failure, aspiration PNA, and L pneumothorax. Poor neuro exam.     HPI, Past Medical, Family, and Social History remains the same as documented in the initial encounter.     Review of Systems   Constitutional: Negative for fever.   HENT: Positive for drooling.    Eyes: Positive for visual disturbance.   Gastrointestinal: Negative for diarrhea and vomiting.   Genitourinary: Negative for difficulty urinating.   Neurological: Positive for speech difficulty, weakness and numbness.     Scheduled Meds:   aspirin  81 mg Per NG tube Daily    cefTRIAXone (ROCEPHIN) IVPB  2 g Intravenous Q24H    And    metronidazole  500 mg Intravenous Q8H    chlorhexidine  15 mL Mouth/Throat BID    famotidine  20 mg Per OG tube Daily    senna-docusate 8.6-50 mg  1 tablet Per OG tube Daily     Continuous Infusions:   buffered 3% sodium acetate 130meq, sodium chloride 130meq, sterile water for inj IV soln 30 mL/hr at 07/06/19 1802     PRN Meds:fentaNYL, pneumoc 13-quita conj-dip cr(PF), sodium chloride 0.9%, sodium chloride 0.9%    Objective:     Vital Signs (Most Recent):  Temp: 97.6 °F (36.4 °C) (07/06/19 1602)  Pulse:  85 (07/06/19 1707)  Resp: 13 (07/06/19 1707)  BP: (!) 185/93 (07/06/19 1707)  SpO2: 99 % (07/06/19 1707)  BP Location: Left arm    Vital Signs Range (Last 24H):  Temp:  [97.2 °F (36.2 °C)-98.1 °F (36.7 °C)]   Pulse:  [61-94]   Resp:  [7-13]   BP: (150-185)/(73-93)   SpO2:  [97 %-100 %]   Arterial Line BP: (157-210)/(58-77)   BP Location: Left arm    Physical Exam   HENT:   Head: Normocephalic and atraumatic.   Right Ear: External ear normal.   Left Ear: External ear normal.   Nose: Nose normal.   ETT in place   Eyes: Right conjunctiva is injected. Left conjunctiva is injected.   Cardiovascular: Normal rate.   No murmur heard.  Abdominal: He exhibits no distension.   Neurological: He is unresponsive. GCS eye subscore is 1. GCS verbal subscore is 1. GCS motor subscore is 3.   Skin: Capillary refill takes 2 to 3 seconds. He is not diaphoretic.   Psychiatric: He is withdrawn.   Neurological Exam:   LOC: comatose   Attention Span: poor  Language: does not follow commands  Articulation: LAY intubated  Orientation: LAY intubated  Visual Fields: no BTT mac  Pupils (CN II, III):  R- fixed. L- sluggisj  Facial Sensation (CN V): R corneal reflex absent, L corneal reflex- depressed    Motor: No movement on upper extremities, slight triple flex on LE    No gag reflex  Delayed cough reflex    Laboratory:  CMP:   Recent Labs   Lab 07/06/19  0028  07/06/19  1758   CALCIUM 8.4*  --   --    ALBUMIN 2.3*  --   --    PROT 9.7*  --   --       < > 141   K 4.6  --   --    CO2 17*  --   --    *  --   --    BUN 50*  --   --    CREATININE 2.8*  --   --    ALKPHOS 55  --   --    ALT 1,197*  --   --    AST 1,261*  --   --    BILITOT 0.5  --   --     < > = values in this interval not displayed.     CBC:   Recent Labs   Lab 07/06/19  0028   WBC 15.75*   RBC 3.26*   HGB 9.6*   HCT 29.0*      MCV 89   MCH 29.4   MCHC 33.1       Diagnostic Results     Brain Imaging     CT head 7/6/2019  Evolving bilateral MCA and left YRIS  distribution infarcts similar to recent MRI brain.    Interval development of diffuse cerebral edema and leftward midline shift measuring in the order of 8 mm.    Subarachnoid hemorrhage.    07.04.2019 - MRI Brain - Large acute strokes throughout the bilateral MCA and left YRIS distributions.  No midline shift, herniation, hydrocephalus or hemorrhagic conversion.    Vessel imaging       IR angio 07/04/2019  Left MCA M1 segment occlusion was noted.  This was treated with embolectomy using aspiration and 2 passes.  There was TICI 3 reperfusion.    Right M1 stump occlusion which is considered most likely to be chronic with good leptomeningeal collateral flow to the MCA territory.  This is not treated, because the patient only presented with a left MCA syndrome      CTA stroke MP 7/4/2019    Noncontrast head CT demonstrates changes of acute infarction involving the left MCA territory including the insula, corona radiata and cortex of the lateral frontal and parietal lobes without hemorrhage.    Left ICA terminus and M1 segment occlusion which appears acute consistent with the patient's acute left MCA syndrome.    Right M1 segment occlusion with a 1.3 cm length of occlusion and distal flow by collaterals. This is likely chronically occluded.    High-grade stenosis of the V4 segment of the left vertebral artery.    Subsegmental ground-glass opacity of the right upper lobe with right-sided pleural effusion.          Andreas Shafer NP  Comprehensive Stroke Center  Department of Vascular Neurology   [facility]

## 2019-07-07 NOTE — ASSESSMENT & PLAN NOTE
"Patient is a 74 yo male with PMHx of CAD and CHF p/w LMCA syndrome started one hour prior to tele-stroke, received tPA and [place] [facility] [place]. Patient got agitated on the way to [facility] which received Versed, upon arrival patient in respiratory distress, s/p intubation, CTA  revealed left carotid terminus occlusion.  - MRI on 7/4 with Large, acute CVA throughout bilateral MCA and L YRIS distribution. No shift, herniation, hydrocephalus, or hemorrhagic conversion noted.     CTH on 7/6 with Evolving bilateral MCA and left YRIS distribution infarcts similar to recent MRI brain. Interval development of diffuse cerebral edema and leftward midline shift measuring in the order of 8 mm and subarachnoid hemorrhage.    NCC had family discussion regarding "dismal prognosis and impending brain death". Family wishes to keep patient full code.     Antithrombotics for secondary stroke prevention: Antiplatelets: ASA  Statins for secondary stroke prevention and hyperlipidemia, if present:   Statins: None: Reason: discontinued d/t elevated liver enzymes    Aggressive risk factor modification: CAD     Rehab efforts: The patient has been evaluated by a stroke team provider and the therapy needs have been fully considered based off the presenting complaints and exam findings. The following therapy evaluations are needed: PT evaluate and treat, OT evaluate and treat, SLP evaluate and treat -- Further assessment pending extubation    · Diagnostics ordered/pending: none    VTE prophylaxis: None: Reason for No Pharmacological VTE Prophylaxis: Mechanical prophylaxis: Place SCDs    BP parameters: Infarct: Post sucessful thrombectomy, SBP <140      "

## 2019-07-07 NOTE — PLAN OF CARE
Problem: Adult Inpatient Plan of Care  Goal: Plan of Care Review  POC reviewed with family at 1400. Family verbalized understanding. Questions and concerns addressed. No acute events today. Pt progressing toward goals. Will continue to monitor. LR @ 100mL/hr. Bilateral pupils 5 and fixed. No movement in UE. R corneal present. See flowsheets for full assessment and VS info.

## 2019-07-07 NOTE — PLAN OF CARE
Problem: Adult Inpatient Plan of Care  Goal: Plan of Care Review  POC reviewed with pt and family at 0500. Pt unable to verbalize understanding, but family was able to verbalize understanding. Questions and concerns addressed. Pt had temp spike of 102.4 F. Applied cooling blanked and problem resolved. Ordered to give 250 mL bolus x 3 overnight for lowering pressure and increase in urinary output. IVF changed from 3% to 2%. It is running at 100 mL currently. Will continue to monitor. See flowsheets for full assessment and VS info

## 2019-07-07 NOTE — NURSING
Pt's A-line reading 97/40 (57). Cuff pressure 104/59 (78). MERI Beckman notified. Orders to go off cuff pressure until MD can come to bedside.

## 2019-07-07 NOTE — PLAN OF CARE
CARDIOLOGY PLAN OF CARE    Patient admitted for bilateral cortical CVA, found to have depressed LVEF 15% with LV thrombus. Cardiology consulted for type II NSTEMI. Recommended ACS protocol, currently on ASA 81mg.      Again, CT head from 7/6  With SAH, 8mm MLS, started on 3% NS. Noted NCC team having GOC discussion with family.     Unfortunately very poor prognosis, remains full code. I went by and offered comfort to family this AM.     Cardiology will follow peripherally, please call for any urgent questions or concerns. Thank you.     Kvng Arzola MD (Raffaele)  Cardiology Fellow  PGY 5  [facility]

## 2019-07-08 PROBLEM — Z51.5 PALLIATIVE CARE ENCOUNTER: Status: ACTIVE | Noted: 2019-01-01

## 2019-07-08 NOTE — PLAN OF CARE
Problem: Adult Inpatient Plan of Care  Goal: Plan of Care Review  POC reviewed with pt and family at 0500. Pt unable to verbalize understanding, but daughter was able to verbalize understanding. Questions and concerns addressed. Will continue to monitor.     NCC notified overnight of MAP <50. Multiple 250 mL boluses given. Levo 4 mg concentration was started at the time until able to titrate accordingly to 16 mg concentration. Vaso was started at 0.04. LR still at 100 mL/hr.    NCC notified of lost flexion in lower limbs.

## 2019-07-08 NOTE — SUBJECTIVE & OBJECTIVE
Interval History: Pt has cerebral infarction due to bilateral embolism of middle cerebral arteries    History reviewed. No pertinent past medical history.    No past surgical history on file.    Review of patient's allergies indicates:  No Known Allergies    Medications:  Continuous Infusions:   lactated ringers 100 mL/hr at 07/08/19 1000    norepinephrine bitartrate-D5W 0.28 mcg/kg/min (07/08/19 1000)    vasopressin (PITRESSIN) infusion 0.04 Units/min (07/08/19 1000)     Scheduled Meds:   sodium chloride 0.9%  2,000 mL Intravenous Once    aspirin  81 mg Per NG tube Daily    cefTRIAXone (ROCEPHIN) IVPB  2 g Intravenous Q24H    And    metronidazole  500 mg Intravenous Q8H    chlorhexidine  15 mL Mouth/Throat BID    famotidine  20 mg Per OG tube Daily    hydrocortisone sodium succinate  100 mg Intravenous Q8H    senna-docusate 8.6-50 mg  1 tablet Per OG tube Daily     PRN Meds:fentaNYL, pneumoc 13-quita conj-dip cr(PF), sodium chloride 0.9%, sodium chloride 0.9%    Family History     None        Tobacco Use    Smoking status: Unknown If Ever Smoked   Substance and Sexual Activity    Alcohol use: Not on file    Drug use: Not on file    Sexual activity: Not on file       Review of Systems   Unable to perform ROS: Mental status change     Objective:     Vital Signs (Most Recent):  Temp: 97.8 °F (36.6 °C) (07/08/19 0705)  Pulse: 80 (07/08/19 0935)  Resp: (!) 0 (07/08/19 0935)  BP: (!) 160/82 (07/08/19 0935)  SpO2: 100 % (07/08/19 0935) Vital Signs (24h Range):  Temp:  [97.6 °F (36.4 °C)-99.3 °F (37.4 °C)] 97.8 °F (36.6 °C)  Pulse:  [] 80  Resp:  [0-21] 0  SpO2:  [98 %-100 %] 100 %  BP: ()/(42-82) 160/82  Arterial Line BP: ()/(28-74) 151/74     Weight: 59.9 kg (132 lb)  Body mass index is 18.41 kg/m².    Review of Symptoms  Symptom Assessment (ESAS 0-10 scale)   ESAS 0 1 2 3 4 5 6 7 8 9 10   Pain              Dyspnea              Anxiety              Nausea              Depression                Anorexia              Fatigue              Insomnia              Restlessness               Agitation              CAM / Delirium __ --  ___+   Constipation     __ --  ___+   Diarrhea           __ --  ___+  Bowel Management Plan (BMP): Yes    Comments: No distress noted. Pt unresponsive.     Performance Status: 10    ECOG Performance Status Grade: 4 - Completely disabled    Physical Exam   Constitutional: He is intubated.   Eyes: Right pupil is not reactive. Left pupil is not reactive.   Fixed pupils   Cardiovascular:   On pressors.    Pulmonary/Chest: He is intubated.   Neurological: He is unresponsive.   Skin: Skin is warm and dry.       Significant Labs: All pertinent labs within the past 24 hours have been reviewed.  CBC:   Recent Labs   Lab 19  2351   WBC 8.10   HGB 9.7*   HCT 31.1*   MCV 95   *     BMP:  Recent Labs   Lab 19  2351 19  0633   *  --    *  159* 157*   K 4.5  --    *  --    CO2 22*  --    BUN 45*  --    CREATININE 2.6*  --    CALCIUM 8.1*  --    MG 2.4  --      LFT:  Lab Results   Component Value Date    AST 1,506 (H) 2019    ALKPHOS 73 2019    BILITOT 0.6 2019     Albumin:   Albumin   Date Value Ref Range Status   2019 1.8 (L) 3.5 - 5.2 g/dL Final     Protein:   Total Protein   Date Value Ref Range Status   2019 8.4 6.0 - 8.4 g/dL Final     Lactic acid:   Lab Results   Component Value Date    LACTATE 1.4 2019    LACTATE 3.4 (H) 2019       Significant Imaging: I have reviewed all pertinent imaging results/findings within the past 24 hours.    Advance Care Planning   Advanced Directives::  Living Will: No  LaPOST: No  Do Not Resuscitate Status: No  Medical Power of : Pt's daughter and son are next of kin. Pt's wife is .        Living Arrangements: Lives with family-pt's mother and sister.    Psychosocial/Cultural:  Pt's wife . She  Dec. 2018. Pt has an adult son and an adult  daughter.

## 2019-07-08 NOTE — SUBJECTIVE & OBJECTIVE
Interval History: Vent 50/10. Pigtail to water seal. No air leak. Clinically deteriorating.      Medications:  Continuous Infusions:   lactated ringers 100 mL/hr at 07/08/19 0605    norepinephrine bitartrate-D5W 0.32 mcg/kg/min (07/08/19 0605)    vasopressin (PITRESSIN) infusion 0.04 Units/min (07/08/19 0605)     Scheduled Meds:   sodium chloride 0.9%  2,000 mL Intravenous Once    aspirin  81 mg Per NG tube Daily    cefTRIAXone (ROCEPHIN) IVPB  2 g Intravenous Q24H    And    metronidazole  500 mg Intravenous Q8H    chlorhexidine  15 mL Mouth/Throat BID    famotidine  20 mg Per OG tube Daily    hydrocortisone sodium succinate  100 mg Intravenous Q8H    senna-docusate 8.6-50 mg  1 tablet Per OG tube Daily     PRN Meds:fentaNYL, pneumoc 13-quita conj-dip cr(PF), sodium chloride 0.9%, sodium chloride 0.9%     Review of patient's allergies indicates:  No Known Allergies  Objective:     Vital Signs (Most Recent):  Temp: 97.8 °F (36.6 °C) (07/08/19 0705)  Pulse: 80 (07/08/19 0705)  Resp: 20 (07/08/19 0705)  BP: (!) 152/82 (07/08/19 0705)  SpO2: 99 % (07/08/19 0705) Vital Signs (24h Range):  Temp:  [97.6 °F (36.4 °C)-99.3 °F (37.4 °C)] 97.8 °F (36.6 °C)  Pulse:  [] 80  Resp:  [0-21] 20  SpO2:  [98 %-100 %] 99 %  BP: ()/(42-82) 152/82  Arterial Line BP: ()/(28-73) 144/64     Intake/Output - Last 3 Shifts       07/06 0700 - 07/07 0659 07/07 0700 - 07/08 0659 07/08 0700 - 07/09 0659    I.V. (mL/kg) 983.5 (16.4) 2589.1 (43.2)     NG/GT  100     IV Piggyback 1100 1600     Total Intake(mL/kg) 2083.5 (34.8) 4289.1 (71.6)     Urine (mL/kg/hr) 3620 (2.5) 1780 (1.2)     Stool 0 0     Chest Tube 10      Total Output 3630 1780     Net -1546.5 +2509.1            Stool Occurrence 0 x 0 x           SpO2: 99 %  O2 Device (Oxygen Therapy): ventilator    Physical Exam   Constitutional: He is intubated.   Cardiovascular: Normal rate.   Pulmonary/Chest: He is intubated.   Chest tube in place to water seal. High PEEP.     Musculoskeletal: He exhibits no edema.   Skin: Skin is warm and dry.   Vitals reviewed.      Significant Labs:  BMP:   Recent Labs   Lab 07/07/19  2351 07/08/19  0633   *  --    *  159* 157*   K 4.5  --    *  --    CO2 22*  --    BUN 45*  --    CREATININE 2.6*  --    CALCIUM 8.1*  --    MG 2.4  --      CBC:   Recent Labs   Lab 07/07/19  2351   WBC 8.10   RBC 3.29*   HGB 9.7*   HCT 31.1*   *   MCV 95   MCH 29.5   MCHC 31.2*       Significant Diagnostics:  CXR: I have reviewed all pertinent results/findings within the past 24 hours    VTE Risk Mitigation (From admission, onward)        Ordered     Reason for No Pharmacological VTE Prophylaxis  Once      07/04/19 0015     IP VTE HIGH RISK PATIENT  Once      07/04/19 0015     Place sequential compression device  Until discontinued      07/04/19 0015

## 2019-07-08 NOTE — ASSESSMENT & PLAN NOTE
Iatrogenic pneumothorax.     Continue pigtail to water seal.   Will leave in place while requiring increased PEEP.   Will follow -up CXR.

## 2019-07-08 NOTE — CONSULTS
Palliative Care Acknowledgement of Consult - .date    Consult received. Palliative Care Provider: Jennifer Schuster, [place], APRN, AGCNS will touch base with team prior to seeing patient. Full consult to follow.    Thank you for allowing us to be a part of the care of this patient.    Shannan Soriano, FATOU, ACHP-SW

## 2019-07-08 NOTE — PROGRESS NOTES
[facility]  Neurocritical Care  Progress Note    Admit Date: 7/3/2019  Service Date: 07/08/2019  Length of Stay: 4    Subjective:     Chief Complaint: Cerebral infarction due to bilateral embolism of middle cerebral arteries    History of Present Illness:        Mr Cristobal  is a 74 yo male with unknown prior medical history who presents to [facility] s/p tPA. LKN was at 1830 he began to drool, developed R sided weakness and facial droop. He was given tPA @ 1955. HE became agitated en route and was given multiple doses of versed. He was intubated upon arrival in the ED due to lethargy and inability to protect  his airway. CTA showed a L ICA terminus occlusion. He was take to IR. He is being admitted to [facility] for a higher level of care.       Hospital Course: 7/4:  Admit NCC , CTA, IR   7/8: Neurological exam is compatible with brain death. Will plan an apnea test after correcting confounding metabolic issues     Interval History:  >4 elements OR status of 3 inpatient conditions    Review of Systems   Unable to perform ROS: Intubated     2 systems OR Unable to obtain a complete ROS due to level of consciousness.  Objective:     Vitals:  Temp: 97.4 °F (36.3 °C)  Pulse: 80  Rhythm: normal sinus rhythm  BP: (!) 149/90  MAP (mmHg): 114  CVP (mean): 165 mmHg  Resp: 20  SpO2: 100 %  Oxygen Concentration (%): 40  O2 Device (Oxygen Therapy): ventilator  Vent Mode: A/C  Set Rate: 20 bmp  Vt Set: 400 mL  Pressure Support: 0 cmH20  PEEP/CPAP: 5 cmH20  Peak Airway Pressure: 18 cmH2O  Mean Airway Pressure: 10 cmH20  Plateau Pressure: 0 cmH20    Temp  Min: 97.4 °F (36.3 °C)  Max: 99.3 °F (37.4 °C)  Pulse  Min: 80  Max: 134  BP  Min: 74/50  Max: 165/79  MAP (mmHg)  Min: 54  Max: 119  CVP (mean)  Min: 0 mmHg  Max: 170 mmHg  Resp  Min: 0  Max: 21  SpO2  Min: 98 %  Max: 100 %  Oxygen Concentration (%)  Min: 40  Max: 50    07/07 0701 - 07/08 0700  In: 4289.1 [I.V.:2589.1]  Out: 1780 [Urine:1780]   Unmeasured Output  Stool  Occurrence: 0       Physical Exam  Constitutional: No apparent distress.   Eyes: Conjunctiva clear, anicteric. Lids no lesions.  Head/Ears/Nose/Mouth/Throat/Neck: Moist mucous membranes. External ears, nose atraumatic.   Cardiovascular: Regular rhythm. ESM  Respiratory: Clear to auscultation. Chest tube in place   Gastrointestinal: No hernia. Soft, nondistended, nontender. + bowel sounds.    Neurologic Examination:  -GCS E1V1M1  Comatose. Absent pupillary response to light. No corneal reflexes bilateral  No gag or cough reflexes  NO VOR  No motor response to noxious stimuli     Medications:  Continuous  norepinephrine bitartrate-D5W Last Rate: 0.22 mcg/kg/min (07/08/19 1400)   vasopressin (PITRESSIN) infusion Last Rate: 0.04 Units/min (07/08/19 1400)   Scheduled  sodium chloride 0.9% 2,000 mL Once   aspirin 81 mg Daily   cefTRIAXone (ROCEPHIN) IVPB 2 g Q24H   And     metronidazole 500 mg Q8H   chlorhexidine 15 mL BID   famotidine 20 mg Daily   hydrocortisone sodium succinate 100 mg Q8H   senna-docusate 8.6-50 mg 1 tablet Daily   PRN  fentaNYL 25 mcg Q2H PRN   pneumoc 13-quita conj-dip cr(PF) 0.5 mL Prior to discharge   sodium chloride 0.9% 10 mL PRN   sodium chloride 0.9% 10 mL PRN     Today I personally reviewed pertinent medications, lines/drains/airways, imaging, cardiology results, laboratory results, microbiology results, notably:    Diet  Diet NPO  Diet NPO        Assessment/Plan:     Neuro  Brainstem herniation  Plans for brain death exam and apnea test     Cytotoxic brain edema  - see stroke     Cerebral infarction due to embolism of left middle cerebral artery    Neurological exam is compatible with brain death. Will plan an apnea test after correcting confounding metabolic issues   Will also get a TCD      Pulmonary  Pneumothorax  Chest tube management per surgery     Acute respiratory failure with hypercapnia  Continue current vent settings  ABG and CXR      Cardiac/Vascular  Cardiogenic shock  MAP>65, wean  pressors as tolerated     NSTEMI (non-ST elevated myocardial infarction)  Poor prognosis   Likely brain dead. No intervention at this point     Renal/  Acute renal failure  Check CMP    GI  Shock liver  Ammonia is slightly high and won't affect brain death exam     Dysphagia  - NPO      Other  Palliative care encounter  Discussed the pan with the daughter at bedside and the son over the phone. I explained to them that his current exam is compatible with brain death but giving the presence of some confounding metabolic derangement I will also get an ancillary test to confirm that.   The patient code status was changed to DNR at this pont           The patient is being Prophylaxed for:  Venous Thromboembolism with: Mechanical  Stress Ulcer with: H2B  Ventilator Pneumonia with: chlorhexidine oral care    Activity Orders          Diet NPO: NPO starting at 07/04 0008        DNR    Uninterrupted Critical Care/Counseling Time (not including procedures): 45 minutes     Mariana Bray MD  Neurocritical Care  [facility]

## 2019-07-08 NOTE — PLAN OF CARE
"Palliative Care Social Work   Assessment  Name: Tevin Cristobal  MRN: [id number]  Date of Birth/Age:  1945  Sex: male  Ethnicity: White    Primary Language:English   Needed: no    Attending Physician: Dr. Tucker  Reason for Referral: assistance with clarification of goals of care  Consult Order Date: 19  Primary CM/SW: Alice Mae    Palliative Care Provider: Jennifer Schuster, [place], APRN, AGCNS    Present during Interview: pt, pt's dtr and [facility] care APRN    Past & Current Medical Situation:   Diagnosis: Cerebral infarction due to bilateral embolism of middle cerebral arteries  PMH: History reviewed. No pertinent past medical history.  Mental Health/Substance Use History: n/a  Non-traditional Health practices:     Understanding of diagnosis and prognosis:Dtr will benefit from clear information regarding dx and px.  Experience/Comfort level with health care system:    Patients Mental Status: Not responsive    Socio-Economic Factors/Resources:  Address: [address] [place] [address] [address] [place] [address]  Phone Number: [phone] (home)     Marital Status:  since last   Household Composition: Lives with sister and mother  Children: 2 children: dtr Kelsey and Son   Relationships with Family: Dtr describes pt as a "genuine man" who would "help anyone that would ask". Dtr stated that pt's wife, her mother,  Last  (2018). He has been living with his family since.     Children close to their father.    Dtr stated that pt was a "good father". Dtr appropriately tearful.     Pt has grandchildren. Dtr's 20 year old daughter is with her.        Emergency Contacts:   Dtr: Kelsey Cristobal: [phone]  Sister: Marissa Raymond: [phone]; [phone]    Activities of Daily Living: Independent prior  Support Systems-Family & Community (Home Health, HME etc): n/a    Transportation:  yes    Work/Education History: Pt is retired   History: no    Financial " Resources:Medicare      Advanced Care Planning & Legal Concerns:   Advanced Directives/Living Will: no   Planning:  no    Power of : no  Surrogate Decision Maker: Daughter and Son      Spirituality, Culture & Coping Mechanisms:  F- Yolanda and Belief: No Synagogue     I - Importance: Has yolanda    C - Community/Culture Values:     A - Address in Care: Spiritual care to follow      Strengths/Coping Strategies: Children present and supportive  Self-Care Activities/Hobbies: Family time    Goals/Hopes/Expectations: Hoping for improvement  Fears/Anxiety/Concerns: Pt's wife  in December, concern for pt        Preferences about EOL Environment: (own bed, family nearby, pets, music, etc)  tbd    Complicated Bereavement Risk Assessment Tool (CBRAT)  Reference:  [facility] Clinical Practice Group (May 2016). Bereavement Risk Screening and Management Guidelines.  Retrieved from: http://www.[employer].com.au/wp-content/uploads//CTEFW-Syvljrfxozz-Eovyrmppb-and-Management-Guideline-2016.pdf      Client Characteristics (Bereaved Client)  ? Under 18      no  ? Was a Twin   no  ? Young Spouse   no  ? Elderly Spouse    no  ? Isolated    no  ? Lacks Meaningful Social Support   no  ? Dissatisfied with help available during illness   no  ? New to Financial [place] no  ? New to Decision-Making   no   History of Loss (Bereaved Client)  ? Cumulative Multiple Losses   no  ? Previous Mental Health Illnesses   no  ? Current Mental Health Illness   Grief- Loss of Mother in 2018  ? Other Significant Health Issues   no   ? Migrant/Refugee   no    Illness  ? Inherited Disorder   no  ? Stigmatized Disease in the   no  ?  Family/Community   no  ? Lengthy/Burdensome   no Relationship with   ? Profound Lifelong Partner   no  ? Highly Dependent    no  ? Antagonistic   no  ? Ambivalent   yes  ? Deeply Connected   yes  ? Culturally Defined   no   Death  ? Sudden or  Unexpected   no  ? Traumatic Circumstances Associated with Death   no  ? Significant Cultural/Social Burdens as a result of Death   no   Risk Factors Scores  0-2  Low  3-5  Moderate  5+  High  All persons scoring moderate to high presume to be at risk**    (** It is acknowledged that protective factors and resilience may outweigh apparent risk factors.      Total Risk Factors Score:   Moderate to High    Increased risk. Pt's wife, Children's mother,  in [place] of . Dtr grieving loss of her mother and tearful over pt's medical status.    Dtr to benefit from close follow up and bereavement support.    Will provide her with bereavement information as appropriate.      Discharge Planning Needs/Plan of Care:       Visit to bedside. Met with dtr Kelsey. Dtr appropriately tearful. Stated that she was waiting to speak with medical team.    Began establishing rapport with dtr. Dtr stated that her mother, pt's wife,  last . Family grieving loss of mother and now father dying.    Palliative Care to continue to follow and provide support and resources as needed.    Spoke with  Arash and updated him of pt's situation. Requested follow up as well.      Shannan Soriano, HUYW, ACHP-SW

## 2019-07-08 NOTE — HPI
The pt  is a 74 y/o male with unknown prior medical history who presents to [facility] s/p tPA..  At 1830 he began to drool, developed R sided weakness and facial droop. He was given tPA @ 1955. HE became agitated en route and was given multiple doses of versed. He was intubated upon arrival in the ED due to lethargy and inability to protect  his airway. CTA showed a L ICA terminus occlusion. He was take to IR. He is being admitted to [facility] for a higher level of care.

## 2019-07-08 NOTE — ASSESSMENT & PLAN NOTE
Discussed the pan with the daughter at bedside and the son over the phone. I explained to them that his current exam is compatible with brain death but giving the presence of some confounding metabolic derangement I will also get an ancillary test to confirm that.   The patient code status was changed to DNR at this pont

## 2019-07-08 NOTE — PROGRESS NOTES
Continued worsening exam today  trops and lfts elevated, no improvement today  Beginning to have signs of di and hypovolemia, switched to lr today  Updated family and dismal prognosis and high risk of progressing to brain death  Remains full code    Vital signs, lab studies, and imaging reviewed by me  Comatose, fixed pupils, no corneals, cough weak, no motor response to central pain  Extremities well perfused  No dependent edema  ett in place, on ac  Belly soft, nontender, no hepatosplenomegaly  Garsia in place with yellow urine     A/p  B/l cortical ischemic stroke in setting of lv thrombus, complicated by coma/debility, cytotoxic cerebral edema and brain compression with brainstem herniation, now with developing hypernatremia and di, may progress to brain death. Dismal prognosis  -permissive htn, lr to replace uop, consider 1/2 ns   -statin, asa, escalating to ac is futile in setting of devastating neurologic injury  -continued goc discussion, grandmother has directed pt's children to maintain full code with no plans of visiting (elderly, frail, [place] out of state). Counselled on dismal prognosis. Consider palliative care consult if pt doesn't progress to brain death.     nstemi w inferolateral ischemia complicated by cardiogenic shock, sung, and shock liver. Now hypotension from hypovolemia and high uop.   -asa and statin  -fluid responsive today, will fluid resuscitate    Aspiration. R ul opacity  -abx for community acquired aspiration pna (ctx and flagyl)    Iatrogenic ptx, hd and cxr stable, pigtail in place  -maintian pigtail today ws  -daily cxr    I spent 30 min of uninterrupted critical care time caring for this critically ill patient and discussing goals of care with surrogate decision makers exclusive of procedures and teaching.

## 2019-07-08 NOTE — PROGRESS NOTES
[facility]  Thoracic Surgery  Progress Note    Subjective:     History of Present Illness:  Tevin Cristobal is a 73 y.o. male with h/o HTN admitted to Neuro ICU for thromboembolic CVA. Following attempted subclavian placement, patient with resultant large left sided pneumothorax. Currently hemodynamically stable. No concern for tension physiology. Surgery consulted for chest tube placement.      Post-Op Info:  * No surgery found *         Interval History: Vent 50/10. Pigtail to water seal. No air leak. Clinically deteriorating.      Medications:  Continuous Infusions:   lactated ringers 100 mL/hr at 07/08/19 0605    norepinephrine bitartrate-D5W 0.32 mcg/kg/min (07/08/19 0605)    vasopressin (PITRESSIN) infusion 0.04 Units/min (07/08/19 0605)     Scheduled Meds:   sodium chloride 0.9%  2,000 mL Intravenous Once    aspirin  81 mg Per NG tube Daily    cefTRIAXone (ROCEPHIN) IVPB  2 g Intravenous Q24H    And    metronidazole  500 mg Intravenous Q8H    chlorhexidine  15 mL Mouth/Throat BID    famotidine  20 mg Per OG tube Daily    hydrocortisone sodium succinate  100 mg Intravenous Q8H    senna-docusate 8.6-50 mg  1 tablet Per OG tube Daily     PRN Meds:fentaNYL, pneumoc 13-quita conj-dip cr(PF), sodium chloride 0.9%, sodium chloride 0.9%     Review of patient's allergies indicates:  No Known Allergies  Objective:     Vital Signs (Most Recent):  Temp: 97.8 °F (36.6 °C) (07/08/19 0705)  Pulse: 80 (07/08/19 0705)  Resp: 20 (07/08/19 0705)  BP: (!) 152/82 (07/08/19 0705)  SpO2: 99 % (07/08/19 0705) Vital Signs (24h Range):  Temp:  [97.6 °F (36.4 °C)-99.3 °F (37.4 °C)] 97.8 °F (36.6 °C)  Pulse:  [] 80  Resp:  [0-21] 20  SpO2:  [98 %-100 %] 99 %  BP: ()/(42-82) 152/82  Arterial Line BP: ()/(28-73) 144/64     Intake/Output - Last 3 Shifts       07/06 0700 - 07/07 0659 07/07 0700 - 07/08 0659 07/08 0700 - 07/09 0659    I.V. (mL/kg) 983.5 (16.4) 2589.1 (43.2)     NG/GT  100     IV  Piggyback 1100 1600     Total Intake(mL/kg) 2083.5 (34.8) 4289.1 (71.6)     Urine (mL/kg/hr) 3620 (2.5) 1780 (1.2)     Stool 0 0     Chest Tube 10      Total Output 3630 1780     Net -1546.5 +2509.1            Stool Occurrence 0 x 0 x           SpO2: 99 %  O2 Device (Oxygen Therapy): ventilator    Physical Exam   Constitutional: He is intubated.   Cardiovascular: Normal rate.   Pulmonary/Chest: He is intubated.   Chest tube in place to water seal. High PEEP.    Musculoskeletal: He exhibits no edema.   Skin: Skin is warm and dry.   Vitals reviewed.      Significant Labs:  BMP:   Recent Labs   Lab 07/07/19 2351 07/08/19  0633   *  --    *  159* 157*   K 4.5  --    *  --    CO2 22*  --    BUN 45*  --    CREATININE 2.6*  --    CALCIUM 8.1*  --    MG 2.4  --      CBC:   Recent Labs   Lab 07/07/19 2351   WBC 8.10   RBC 3.29*   HGB 9.7*   HCT 31.1*   *   MCV 95   MCH 29.5   MCHC 31.2*       Significant Diagnostics:  CXR: I have reviewed all pertinent results/findings within the past 24 hours    VTE Risk Mitigation (From admission, onward)        Ordered     Reason for No Pharmacological VTE Prophylaxis  Once      07/04/19 0015     IP VTE HIGH RISK PATIENT  Once      07/04/19 0015     Place sequential compression device  Until discontinued      07/04/19 0015        Assessment/Plan:     Pneumothorax  Iatrogenic pneumothorax.     Continue pigtail to water seal.   Will leave in place while requiring increased PEEP.   Will follow -up CXR.         Rhoda Cabrera PA-C  Thoracic Surgery  [facility]

## 2019-07-08 NOTE — HPI
Tevin Cristobal is a 73 y.o. male with h/o HTN admitted to Neuro ICU for thromboembolic CVA. Following attempted subclavian placement, patient with resultant large left sided pneumothorax. Currently hemodynamically stable. No concern for tension physiology. Surgery consulted for chest tube placement.

## 2019-07-08 NOTE — ASSESSMENT & PLAN NOTE
Neurological exam is compatible with brain death. Will plan an apnea test after correcting confounding metabolic issues   Will also get a TCD

## 2019-07-08 NOTE — ASSESSMENT & PLAN NOTE
"Patient is a 72 yo male with PMHx of CAD and CHF p/w LMCA syndrome started one hour prior to tele-stroke, received tPA and transferred to [facility] for ICU evaluation. Patient got agitated on the way to [facility] which received Versed, upon arrival patient in respiratory distress, s/p intubation, CTA  revealed left carotid terminus occlusion.  - MRI on 7/4 with Large, acute CVA throughout bilateral MCA and L YRIS distribution. No shift, herniation, hydrocephalus, or hemorrhagic conversion noted.     CTH on 7/6 with Evolving bilateral MCA and left YRIS distribution infarcts similar to recent MRI brain. Interval development of diffuse cerebral edema and leftward midline shift measuring in the order of 8 mm and subarachnoid hemorrhage.    NCC had family discussion regarding "dismal prognosis and impending brain death". Family wishes to keep patient full code.     Antithrombotics for secondary stroke prevention: Antiplatelets: ASA  Statins for secondary stroke prevention and hyperlipidemia, if present:   Statins: None: Reason: discontinued d/t elevated liver enzymes    Aggressive risk factor modification: CAD     Rehab efforts: The patient has been evaluated by a stroke team provider and the therapy needs have been fully considered based off the presenting complaints and exam findings. The following therapy evaluations are needed: PT evaluate and treat, OT evaluate and treat, SLP evaluate and treat -- Further assessment pending extubation    · Diagnostics ordered/pending: none    VTE prophylaxis: None: Reason for No Pharmacological VTE Prophylaxis: Mechanical prophylaxis: Place SCDs    BP parameters: Infarct: Post sucessful thrombectomy, SBP <140      "

## 2019-07-08 NOTE — PROGRESS NOTES
[facility]  Vascular Neurology  Comprehensive Stroke Center  Progress Note    Assessment/Plan:     * Cerebral infarction due to bilateral embolism of middle cerebral arteries  Patient is a 74 yo male with PMHx of CAD and CHF p/w LMCA syndrome started one hour prior to tele-stroke, received tPA and transferred to [facility] for ICU evaluation. Patient got agitated on the way to [facility] which received Versed, upon arrival patient in respiratory distress, s/p intubation, CTA  revealed left carotid terminus occlusion.  - MRI on 7/4 with Large, acute CVA throughout bilateral MCA and L YRIS distribution. No shift, herniation, hydrocephalus, or hemorrhagic conversion noted.     CTH on 7/6 with Evolving bilateral MCA and left YRIS distribution infarcts similar to recent MRI brain. Interval development of diffuse cerebral edema and leftward midline shift measuring in the order of 8 mm and subarachnoid hemorrhage.    Per NCC the pt's Neurological exam is compatible with brain death. They will plan an apnea test after correcting confounding metabolic issues. Code status changed to DNR today.    Antithrombotics for secondary stroke prevention: Antiplatelets: ASA  Statins for secondary stroke prevention and hyperlipidemia, if present:   Statins: None: Reason: discontinued d/t elevated liver enzymes    Aggressive risk factor modification: CAD     Rehab efforts: The patient has been evaluated by a stroke team provider and the therapy needs have been fully considered based off the presenting complaints and exam findings. The following therapy evaluations are needed: PT evaluate and treat, OT evaluate and treat, SLP evaluate and treat -- Further assessment pending extubation    · Diagnostics ordered/pending: none    VTE prophylaxis: None: Reason for No Pharmacological VTE Prophylaxis: Mechanical prophylaxis: Place SCDs    BP parameters: Infarct: Post sucessful thrombectomy, SBP <140      Pneumothorax  S/p chest  "tube    Acute renal failure  -- Management per NCC    NSTEMI (non-ST elevated myocardial infarction)  Management per NCC/Cardiology    Acute respiratory failure with hypercapnia  -- Management per NCC  -- Now with L Pneuomothorax - chest tube in place  -- On CFX/Flagyl for presumed aspiration PNA    Cytotoxic brain edema  Area of cytotoxic cerebral edema identified when reviewing brain imaging in the territory of the bilateral middle cerebral arteries. There is mass effect associated with it. We will continue to monitor the patients clinical exam for any worsening of symptoms which may indicate expansion of the stroke or the area of the edema resulting in the clinical change. The pattern is suggestive of emobolic etiology.    CTH 7/6 revealed increased edema, impending herniation       7/6/2019- patient with poor prognosis and neuro exam. NCC to have family discussion about code status.     7/7/2019- Neuro exam and prognosis remains poor. CTH completed on 7/6 revealed interval development of diffuse cerebral edema and leftward midline shift . NCC had family discussion regarding "dismal prognosis and impending brain death". Family wishes to keep patient full code.     7/8/2019 - Worsening neuro exam. Unable to illicit reflexes. Neurological exam is compatible with brain death. Pt made DNR today.    STROKE DOCUMENTATION   Acute Stroke Times   Last Known Normal Date: 07/03/19  Last Known Normal Time: 1830  Symptom Onset Date: 07/03/19  Stroke Team Arrival Date: 07/03/19  Stroke Team Arrival Time: 1924  CT Interpretation Time: 1932  Decision to Treat Time for Alteplase: 1940  Decision to Treat Time for IR: 0032    NIH Scale:  1a. Level of Consciousness: 3-->Responds only with reflex motor or autonomic effects or totally unresponsive, flaccid, and areflexic  1b. LOC Questions: 2-->Answers neither question correctly  1c. LOC Commands: 2-->Performs neither task correctly  2. Best Gaze: 2-->Forced deviation, or total gaze " paresis not overcome by the oculocephalic maneuver  3. Visual: 3-->Bilateral hemianopia (blind including cortical blindness)  4. Facial Palsy: 0-->Normal symmetrical movements  5a. Motor Arm, Left: 3-->No effort against gravity, limb falls  5b. Motor Arm, Right: 3-->No effort against gravity, limb falls  6a. Motor Leg, Left: 3-->No effort against gravity, leg falls to bed immediately  6b. Motor Leg, Right: 3-->No effort against gravity, leg falls to bed immediately  7. Limb Ataxia: 0-->Absent  8. Sensory: 2-->Severe to total sensory loss, patient is not aware of being touched in the face, arm, and leg  9. Best Language: 3-->Mute, global aphasia, no usable speech or auditory comprehension  10. Dysarthria: (UN) Intubated or other physical barrier  11. Extinction and Inattention (formerly Neglect): 2-->Profound sydnee-inattention/extinction more than 1 modality  Total (NIH Stroke Scale): 31     Maritza Coma Scale:  3      Neurologic Chief Complaint: Cerebral infarction throughout bilateral MCA and L YRIS distribution.    Subjective:     Interval History:  Neuro exam and prognosis remains poor. Both pupils are now fixed. No cough reflex, no gag reflex, and no corneal reflex present. CTH completed on 7/6 revealed interval development of diffuse cerebral edema and leftward midline shift. Pt rapidly progressing towards brain death and approaching multi system organ failure with brain, heart, lung, kidney, and liver dysfunction. Pt now DNR    HPI, Past Medical, Family, and Social History remains the same as documented in the initial encounter.     Review of Systems   Unable to obtain: Intubated    Scheduled Meds:   sodium chloride 0.9%  2,000 mL Intravenous Once    aspirin  81 mg Per NG tube Daily    cefTRIAXone (ROCEPHIN) IVPB  2 g Intravenous Q24H    And    metronidazole  500 mg Intravenous Q8H    chlorhexidine  15 mL Mouth/Throat BID    famotidine  20 mg Per OG tube Daily    hydrocortisone sodium succinate  100 mg  Intravenous Q8H    senna-docusate 8.6-50 mg  1 tablet Per OG tube Daily     Continuous Infusions:   lactated ringers 100 mL/hr at 07/08/19 0705    norepinephrine bitartrate-D5W 0.32 mcg/kg/min (07/08/19 0705)    vasopressin (PITRESSIN) infusion 0.04 Units/min (07/08/19 0705)     PRN Meds:fentaNYL, pneumoc 13-quita conj-dip cr(PF), sodium chloride 0.9%, sodium chloride 0.9%    Objective:     Vital Signs (Most Recent):  Temp: 97.8 °F (36.6 °C) (07/08/19 0705)  Pulse: 80 (07/08/19 0705)  Resp: 20 (07/08/19 0705)  BP: (!) 152/82 (07/08/19 0705)  SpO2: 99 % (07/08/19 0705)  BP Location: Left arm    Vital Signs Range (Last 24H):  Temp:  [97.6 °F (36.4 °C)-99.3 °F (37.4 °C)]   Pulse:  []   Resp:  [0-21]   BP: ()/(42-82)   SpO2:  [98 %-100 %]   Arterial Line BP: ()/(28-73)   BP Location: Left arm    Physical Exam   HENT:   Head: Normocephalic and atraumatic.   Right Ear: External ear normal.   Left Ear: External ear normal.   Nose: Nose normal.   ETT in place   Eyes: Right conjunctiva is injected. Left conjunctiva is injected.   Cardiovascular: Normal rate.   No murmur heard.  Abdominal: He exhibits no distension.   Neurological: He is unresponsive. GCS eye subscore is 1. GCS verbal subscore is 1. GCS motor subscore is 3.   Skin: Capillary refill takes 2 to 3 seconds. He is not diaphoretic.   Psychiatric: He is withdrawn.   Neurological Exam:   LOC: comatose   Attention Span: poor  Language: does not follow commands  Articulation: LAY intubated  Orientation: LAY intubated  Visual Fields: no BTT mac  Pupils (CN II, III):  R- fixed. L- fixed  Facial Sensation (CN V): corneal reflex absent bilat  Motor: extensor posturing on upper extremities, slight triple flex on LE    No gag reflex  No cough reflex    Laboratory:  CMP:   Recent Labs   Lab 07/07/19  2351 07/08/19  0633   CALCIUM 8.1*  --    ALBUMIN 1.8*  --    PROT 8.4  --    *  159* 157*   K 4.5  --    CO2 22*  --    *  --    BUN 45*  --     CREATININE 2.6*  --    ALKPHOS 73  --    ALT 1,251*  --    AST 1,506*  --    BILITOT 0.6  --      CBC:   Recent Labs   Lab 07/07/19  2351   WBC 8.10   RBC 3.29*   HGB 9.7*   HCT 31.1*   *   MCV 95   MCH 29.5   MCHC 31.2*       Diagnostic Results     Brain Imaging     CT head 7/6/2019  Evolving bilateral MCA and left YRIS distribution infarcts similar to recent MRI brain.    Interval development of diffuse cerebral edema and leftward midline shift measuring in the order of 8 mm.    Subarachnoid hemorrhage.    07.04.2019 - MRI Brain - Large acute strokes throughout the bilateral MCA and left YRIS distributions.  No midline shift, herniation, hydrocephalus or hemorrhagic conversion.    Vessel imaging       IR angio 07/04/2019  Left MCA M1 segment occlusion was noted.  This was treated with embolectomy using aspiration and 2 passes.  There was TICI 3 reperfusion.    Right M1 stump occlusion which is considered most likely to be chronic with good leptomeningeal collateral flow to the MCA territory.  This is not treated, because the patient only presented with a left MCA syndrome      CTA stroke MP 7/4/2019    Noncontrast head CT demonstrates changes of acute infarction involving the left MCA territory including the insula, corona radiata and cortex of the lateral frontal and parietal lobes without hemorrhage.    Left ICA terminus and M1 segment occlusion which appears acute consistent with the patient's acute left MCA syndrome.    Right M1 segment occlusion with a 1.3 cm length of occlusion and distal flow by collaterals. This is likely chronically occluded.    High-grade stenosis of the V4 segment of the left vertebral artery.    Subsegmental ground-glass opacity of the right upper lobe with right-sided pleural effusion.          Pacheco Davis MD  Comprehensive Stroke Center  Department of Vascular Neurology   [facility]

## 2019-07-08 NOTE — PLAN OF CARE
07/08/19 1247   Discharge Reassessment   Assessment Type Discharge Planning Reassessment   Provided patient/caregiver education on the expected discharge date and the discharge plan No   Do you have any problems affording any of your prescribed medications? No   Discharge Plan A Long-term acute care facility (LTAC)   Discharge Plan B Other  (Per MD, palliative care consult)   DME Needed Upon Discharge  other (see comments)  (tbd)   Anticipated Discharge Disposition LTAC   Can the patient answer the patient profile reliably? No, cognitively impaired   How does the patient rate their overall health at the present time?   (rodríguez)   Describe the patient's ability to walk at the present time. Does not walk or unable to take any steps at all   How often would a person be available to care for the patient? Occasionally   Number of comorbid conditions (as recorded on the chart) Three   During the past month, has the patient often been bothered by feeling down, depressed or hopeless?   (rodríguez)   During the past month, has the patient often been bothered by little interest or pleasure in doing things?   (rodríguez)   Post-Acute Status   Post-Acute Authorization Placement   Post-Acute Placement Status Awaiting Internal Medical Clearance   Discharge Delays None known at this time       Brandy Estrella RN, CCRN-K, [facility]  Neuro-Critical Care   [phone]

## 2019-07-08 NOTE — PLAN OF CARE
Problem: Adult Inpatient Plan of Care  Goal: Plan of Care Review  Outcome: Ongoing (interventions implemented as appropriate)  POC reviewed with pt and family at 1400. Pt unresponsive and unable to verbalize an understanding, pt's daughter verbalizes an understanding,  Questions and concerns addressed. No acute events today. gcs 3, plan for brain death study tomorrow, pt now dnr, vaso and levo infusing, chest tube in place, uo 0-100, dark brown, turned every 2 hrs, skin remains intact, pt remains free from falls and injuries. Pt progressing toward goals. Will continue to monitor. See flowsheets for full assessment and VS info.

## 2019-07-08 NOTE — SUBJECTIVE & OBJECTIVE
Interval History:  >4 elements OR status of 3 inpatient conditions    Review of Systems   Unable to perform ROS: Intubated     2 systems OR Unable to obtain a complete ROS due to level of consciousness.  Objective:     Vitals:  Temp: 97.4 °F (36.3 °C)  Pulse: 80  Rhythm: normal sinus rhythm  BP: (!) 149/90  MAP (mmHg): 114  CVP (mean): 165 mmHg  Resp: 20  SpO2: 100 %  Oxygen Concentration (%): 40  O2 Device (Oxygen Therapy): ventilator  Vent Mode: A/C  Set Rate: 20 bmp  Vt Set: 400 mL  Pressure Support: 0 cmH20  PEEP/CPAP: 5 cmH20  Peak Airway Pressure: 18 cmH2O  Mean Airway Pressure: 10 cmH20  Plateau Pressure: 0 cmH20    Temp  Min: 97.4 °F (36.3 °C)  Max: 99.3 °F (37.4 °C)  Pulse  Min: 80  Max: 134  BP  Min: 74/50  Max: 165/79  MAP (mmHg)  Min: 54  Max: 119  CVP (mean)  Min: 0 mmHg  Max: 170 mmHg  Resp  Min: 0  Max: 21  SpO2  Min: 98 %  Max: 100 %  Oxygen Concentration (%)  Min: 40  Max: 50    07/07 0701 - 07/08 0700  In: 4289.1 [I.V.:2589.1]  Out: 1780 [Urine:1780]   Unmeasured Output  Stool Occurrence: 0       Physical Exam  Constitutional: No apparent distress.   Eyes: Conjunctiva clear, anicteric. Lids no lesions.  Head/Ears/Nose/Mouth/Throat/Neck: Moist mucous membranes. External ears, nose atraumatic.   Cardiovascular: Regular rhythm. ESM  Respiratory: Clear to auscultation. Chest tube in place   Gastrointestinal: No hernia. Soft, nondistended, nontender. + bowel sounds.    Neurologic Examination:  -GCS E1V1M1  Comatose. Absent pupillary response to light. No corneal reflexes bilateral  No gag or cough reflexes  NO VOR  No motor response to noxious stimuli     Medications:  Continuous  norepinephrine bitartrate-D5W Last Rate: 0.22 mcg/kg/min (07/08/19 1400)   vasopressin (PITRESSIN) infusion Last Rate: 0.04 Units/min (07/08/19 1400)   Scheduled  sodium chloride 0.9% 2,000 mL Once   aspirin 81 mg Daily   cefTRIAXone (ROCEPHIN) IVPB 2 g Q24H   And     metronidazole 500 mg Q8H   chlorhexidine 15 mL BID    famotidine 20 mg Daily   hydrocortisone sodium succinate 100 mg Q8H   senna-docusate 8.6-50 mg 1 tablet Daily   PRN  fentaNYL 25 mcg Q2H PRN   pneumoc 13-quita conj-dip cr(PF) 0.5 mL Prior to discharge   sodium chloride 0.9% 10 mL PRN   sodium chloride 0.9% 10 mL PRN     Today I personally reviewed pertinent medications, lines/drains/airways, imaging, cardiology results, laboratory results, microbiology results, notably:    Diet  Diet NPO  Diet NPO

## 2019-07-08 NOTE — ASSESSMENT & PLAN NOTE
Impression: Pt is a 74 y/o male with PMHx of CAD and CHF s/p LMCA syndrome started one hour prior to tele-stroke, received tPA and transferred to [facility] for ICU evaluation. Pt has  cytotoxic cerebral edema and brain compression with brainstem herniation, now with developing hypernatremia / DI, may progress to brain death per NCC. Pt has fixed pupils. Pt unresponsive. No purposeful movement noted by pt. Pt is a Full code.     Reason for consult: [facility] care consulted for support.  Per NCC MD, possible brain death studies today.     Goals of care: Met with pt's daughter who is at bedside. Per daughter, pt has adult children-herself and a son. Pt's wife  this past Vahid. Family still grieving over death of pt's wife. Pt's daughter is tearful. Spoke to pt's daughter about testing NCC team may do today to assess brain activity. Daughter verbalized understanding.      Support given.     Plan:   Possible brain death certification studies per NCC team today.   Support given to pt's daughter who is at bedside. Family just lost pt's wife in December.   Rapport established.

## 2019-07-08 NOTE — SUBJECTIVE & OBJECTIVE
Neurologic Chief Complaint: Cerebral infarction throughout bilateral MCA and L YRIS distribution.    Subjective:     Interval History:  Neuro exam and prognosis remains poor. Both pupils are now fixed. No cough reflex, no gag reflex, and no corneal reflex present. CTH completed on 7/6 revealed interval development of diffuse cerebral edema and leftward midline shift. Pt rapidly progressing towards brain death and approaching multi system organ failure with brain, heart, lung, kidney, and liver dysfunction. Pt remains full code.     HPI, Past Medical, Family, and Social History remains the same as documented in the initial encounter.     Review of Systems   Constitutional: Negative for fever.   HENT: Positive for drooling.    Eyes: Positive for visual disturbance.   Gastrointestinal: Negative for diarrhea and vomiting.   Genitourinary: Negative for difficulty urinating.   Neurological: Positive for speech difficulty, weakness and numbness.     Scheduled Meds:   sodium chloride 0.9%  2,000 mL Intravenous Once    aspirin  81 mg Per NG tube Daily    cefTRIAXone (ROCEPHIN) IVPB  2 g Intravenous Q24H    And    metronidazole  500 mg Intravenous Q8H    chlorhexidine  15 mL Mouth/Throat BID    famotidine  20 mg Per OG tube Daily    hydrocortisone sodium succinate  100 mg Intravenous Q8H    senna-docusate 8.6-50 mg  1 tablet Per OG tube Daily     Continuous Infusions:   lactated ringers 100 mL/hr at 07/08/19 0705    norepinephrine bitartrate-D5W 0.32 mcg/kg/min (07/08/19 0705)    vasopressin (PITRESSIN) infusion 0.04 Units/min (07/08/19 0705)     PRN Meds:fentaNYL, pneumoc 13-quita conj-dip cr(PF), sodium chloride 0.9%, sodium chloride 0.9%    Objective:     Vital Signs (Most Recent):  Temp: 97.8 °F (36.6 °C) (07/08/19 0705)  Pulse: 80 (07/08/19 0705)  Resp: 20 (07/08/19 0705)  BP: (!) 152/82 (07/08/19 0705)  SpO2: 99 % (07/08/19 0705)  BP Location: Left arm    Vital Signs Range (Last 24H):  Temp:  [97.6 °F (36.4  °C)-99.3 °F (37.4 °C)]   Pulse:  []   Resp:  [0-21]   BP: ()/(42-82)   SpO2:  [98 %-100 %]   Arterial Line BP: ()/(28-73)   BP Location: Left arm    Physical Exam   HENT:   Head: Normocephalic and atraumatic.   Right Ear: External ear normal.   Left Ear: External ear normal.   Nose: Nose normal.   ETT in place   Eyes: Right conjunctiva is injected. Left conjunctiva is injected.   Cardiovascular: Normal rate.   No murmur heard.  Abdominal: He exhibits no distension.   Neurological: He is unresponsive. GCS eye subscore is 1. GCS verbal subscore is 1. GCS motor subscore is 3.   Skin: Capillary refill takes 2 to 3 seconds. He is not diaphoretic.   Psychiatric: He is withdrawn.   Neurological Exam:   LOC: comatose   Attention Span: poor  Language: does not follow commands  Articulation: LAY intubated  Orientation: LAY intubated  Visual Fields: no BTT mac  Pupils (CN II, III):  R- fixed. L- fixed  Facial Sensation (CN V): corneal reflex absent bilat  Motor: extensor posturing on upper extremities, slight triple flex on LE    No gag reflex  No cough reflex    Laboratory:  CMP:   Recent Labs   Lab 07/07/19  2351 07/08/19  0633   CALCIUM 8.1*  --    ALBUMIN 1.8*  --    PROT 8.4  --    *  159* 157*   K 4.5  --    CO2 22*  --    *  --    BUN 45*  --    CREATININE 2.6*  --    ALKPHOS 73  --    ALT 1,251*  --    AST 1,506*  --    BILITOT 0.6  --      CBC:   Recent Labs   Lab 07/07/19  2351   WBC 8.10   RBC 3.29*   HGB 9.7*   HCT 31.1*   *   MCV 95   MCH 29.5   MCHC 31.2*       Diagnostic Results     Brain Imaging     CT head 7/6/2019  Evolving bilateral MCA and left YRIS distribution infarcts similar to recent MRI brain.    Interval development of diffuse cerebral edema and leftward midline shift measuring in the order of 8 mm.    Subarachnoid hemorrhage.    07.04.2019 - MRI Brain - Large acute strokes throughout the bilateral MCA and left YRIS distributions.  No midline shift, herniation,  hydrocephalus or hemorrhagic conversion.    Vessel imaging       IR angio 07/04/2019  Left MCA M1 segment occlusion was noted.  This was treated with embolectomy using aspiration and 2 passes.  There was TICI 3 reperfusion.    Right M1 stump occlusion which is considered most likely to be chronic with good leptomeningeal collateral flow to the MCA territory.  This is not treated, because the patient only presented with a left MCA syndrome      CTA stroke MP 7/4/2019    Noncontrast head CT demonstrates changes of acute infarction involving the left MCA territory including the insula, corona radiata and cortex of the lateral frontal and parietal lobes without hemorrhage.    Left ICA terminus and M1 segment occlusion which appears acute consistent with the patient's acute left MCA syndrome.    Right M1 segment occlusion with a 1.3 cm length of occlusion and distal flow by collaterals. This is likely chronically occluded.    High-grade stenosis of the V4 segment of the left vertebral artery.    Subsegmental ground-glass opacity of the right upper lobe with right-sided pleural effusion.

## 2019-07-08 NOTE — CONSULTS
[facility]  Palliative Medicine  Consult Note    Patient Name: Tevin Cristobal  MRN: [id number]  Admission Date: 7/3/2019  Hospital Length of Stay: 4 days  Code Status: Full Code   Attending Provider: Torey Tucker MD  Consulting Provider: JOEL Plaza  Primary Care Physician: Nathanael Peck MD  Principal Problem:Cerebral infarction due to bilateral embolism of middle cerebral arteries    Patient information was obtained from relative(s) and ER records.      Consults  Assessment/Plan:     Palliative care encounter  Impression: Pt is a 74 y/o male with PMHx of CAD and CHF s/p LMCA syndrome started one hour prior to tele-stroke, received tPA and transferred [place] [facility] [place]. Pt has  cytotoxic cerebral edema and brain compression with brainstem herniation, now with developing hypernatremia / DI, may progress to brain death per NCC.  Pt has fixed pupils. Pt unresponsive. No purposeful movement noted by pt. Pt is a Full code.     Reason for consult: [facility] care consulted for support.  Per NCC MD, possible brain death studies today.     Goals of care: Met with pt's daughter who is at bedside. Per daughter, pt has adult children-herself and a son. Pt's wife  this past [place]. Family still grieving over death of pt's wife. Pt's daughter is tearful. Spoke to pt's daughter about testing NCC team may do today to assess brain activity. Daughter verbalized understanding.      Support given.     Plan:   Possible brain death certification studies per NCC team today.   Support given to pt's daughter who is at bedside. Family just lost pt's wife in December.   Rapport established.           Thank you for your consult. I will follow-up with patient. Please contact us if you have any additional questions.    Subjective:     HPI:   The pt  is a 74 y/o male with unknown prior medical history who presents to [facility] s/p tPA..  At 1830 he began to drool, developed R sided weakness and facial  droop. He was given tPA @ 1955. HE became agitated en route and was given multiple doses of versed. He was intubated upon arrival in the ED due to lethargy and inability to protect  his airway. CTA showed a L ICA terminus occlusion. He was take to IR. He is being admitted to [facility] for a higher level of care.     Hospital Course:  No notes on file    Interval History: Pt has cerebral infarction due to bilateral embolism of middle cerebral arteries    History reviewed. No pertinent past medical history.    No past surgical history on file.    Review of patient's allergies indicates:  No Known Allergies    Medications:  Continuous Infusions:   lactated ringers 100 mL/hr at 07/08/19 1000    norepinephrine bitartrate-D5W 0.28 mcg/kg/min (07/08/19 1000)    vasopressin (PITRESSIN) infusion 0.04 Units/min (07/08/19 1000)     Scheduled Meds:   sodium chloride 0.9%  2,000 mL Intravenous Once    aspirin  81 mg Per NG tube Daily    cefTRIAXone (ROCEPHIN) IVPB  2 g Intravenous Q24H    And    metronidazole  500 mg Intravenous Q8H    chlorhexidine  15 mL Mouth/Throat BID    famotidine  20 mg Per OG tube Daily    hydrocortisone sodium succinate  100 mg Intravenous Q8H    senna-docusate 8.6-50 mg  1 tablet Per OG tube Daily     PRN Meds:fentaNYL, pneumoc 13-quita conj-dip cr(PF), sodium chloride 0.9%, sodium chloride 0.9%    Family History     None        Tobacco Use    Smoking status: Unknown If Ever Smoked   Substance and Sexual Activity    Alcohol use: Not on file    Drug use: Not on file    Sexual activity: Not on file       Review of Systems   Unable to perform ROS: Mental status change     Objective:     Vital Signs (Most Recent):  Temp: 97.8 °F (36.6 °C) (07/08/19 0705)  Pulse: 80 (07/08/19 0935)  Resp: (!) 0 (07/08/19 0935)  BP: (!) 160/82 (07/08/19 0935)  SpO2: 100 % (07/08/19 0935) Vital Signs (24h Range):  Temp:  [97.6 °F (36.4 °C)-99.3 °F (37.4 °C)] 97.8 °F (36.6 °C)  Pulse:  [] 80  Resp:  [0-21]  0  SpO2:  [98 %-100 %] 100 %  BP: ()/(42-82) 160/82  Arterial Line BP: ()/(28-74) 151/74     Weight: 59.9 kg (132 lb)  Body mass index is 18.41 kg/m².    Review of Symptoms  Symptom Assessment (ESAS 0-10 scale)   ESAS 0 1 2 3 4 5 6 7 8 9 10   Pain              Dyspnea              Anxiety              Nausea              Depression               Anorexia              Fatigue              Insomnia              Restlessness               Agitation              CAM / Delirium __ --  ___+   Constipation     __ --  ___+   Diarrhea           __ --  ___+  Bowel Management Plan (BMP): Yes    Comments: No distress noted. Pt unresponsive.     Performance Status: 10    ECOG Performance Status Grade: 4 - Completely disabled    Physical Exam   Constitutional: He is intubated.   Eyes: Right pupil is not reactive. Left pupil is not reactive.   Fixed pupils   Cardiovascular:   On pressors.    Pulmonary/Chest: He is intubated.   Neurological: He is unresponsive.   Skin: Skin is warm and dry.       Significant Labs: All pertinent labs within the past 24 hours have been reviewed.  CBC:   Recent Labs   Lab 07/07/19  2351   WBC 8.10   HGB 9.7*   HCT 31.1*   MCV 95   *     BMP:  Recent Labs   Lab 07/07/19  2351 07/08/19  0633   *  --    *  159* 157*   K 4.5  --    *  --    CO2 22*  --    BUN 45*  --    CREATININE 2.6*  --    CALCIUM 8.1*  --    MG 2.4  --      LFT:  Lab Results   Component Value Date    AST 1,506 (H) 07/07/2019    ALKPHOS 73 07/07/2019    BILITOT 0.6 07/07/2019     Albumin:   Albumin   Date Value Ref Range Status   07/07/2019 1.8 (L) 3.5 - 5.2 g/dL Final     Protein:   Total Protein   Date Value Ref Range Status   07/07/2019 8.4 6.0 - 8.4 g/dL Final     Lactic acid:   Lab Results   Component Value Date    LACTATE 1.4 07/05/2019    LACTATE 3.4 (H) 07/05/2019       Significant Imaging: I have reviewed all pertinent imaging results/findings within the past 24 hours.    Advance Care  Planning   Advanced Directives::  Living Will: No  LaPOST: No  Do Not Resuscitate Status: No  Medical Power of : Pt's daughter and son are next of kin. Pt's wife is .        Living Arrangements: Lives with family-pt's mother and sister.    Psychosocial/Cultural:  Pt's wife . She  Dec. 2018. Pt has an adult son and an adult daughter.          > 50% of 55 min visit spent in chart review, face to face discussion of goals of care,  symptom assessment, coordination of care and emotional support.    Jennifer Schuster, CNS  Palliative Medicine  [facility]

## 2019-07-09 NOTE — SUBJECTIVE & OBJECTIVE
Interval History:  >4 elements OR status of 3 inpatient conditions    Review of Systems   Unable to perform ROS: Intubated     2 systems OR Unable to obtain a complete ROS due to level of consciousness.  Objective:     Vitals:  Temp: 98 °F (36.7 °C)  Pulse: 76  Rhythm: normal sinus rhythm  BP: (!) 156/97  MAP (mmHg): 121  Resp: 20  SpO2: 100 %  Oxygen Concentration (%): 40  O2 Device (Oxygen Therapy): ventilator  Vent Mode: A/C  Set Rate: 20 bmp  Vt Set: 400 mL  Pressure Support: 0 cmH20  PEEP/CPAP: 5 cmH20  Peak Airway Pressure: 18 cmH2O  Mean Airway Pressure: 10 cmH20  Plateau Pressure: 0 cmH20    Temp  Min: 97.2 °F (36.2 °C)  Max: 98.8 °F (37.1 °C)  Pulse  Min: 70  Max: 84  BP  Min: 123/71  Max: 166/98  MAP (mmHg)  Min: 92  Max: 126  Resp  Min: 0  Max: 20  SpO2  Min: 100 %  Max: 100 %  Oxygen Concentration (%)  Min: 40  Max: 40    07/08 0701 - 07/09 0700  In: 1708.1 [I.V.:1298.1]  Out: 605 [Urine:595]   Unmeasured Output  Stool Occurrence: 0       Physical Exam    Constitutional: No apparent distress.   Eyes: Conjunctiva clear, anicteric. Lids no lesions.  Head/Ears/Nose/Mouth/Throat/Neck: Moist mucous membranes. External ears, nose atraumatic.   Cardiovascular: Regular rhythm. ESM  Respiratory: Clear to auscultation. Chest tube in place   Gastrointestinal: No hernia. Soft, nondistended, nontender. + bowel sounds.    Neurologic Examination:  -GCS E1V1M1  Comatose. Absent pupillary response to light. No corneal reflexes bilateral  No gag or cough reflexes  NO VOR  No motor response to noxious stimuli     Medications:  Continuous    norepinephrine bitartrate-D5W Last Rate: 0.1 mcg/kg/min (07/09/19 1100)   vasopressin (PITRESSIN) infusion Last Rate: 0.04 Units/min (07/09/19 1100)   Scheduled    sodium chloride 0.9% 2,000 mL Once   aspirin 81 mg Daily   chlorhexidine 15 mL BID   famotidine 20 mg Daily   hydrocortisone sodium succinate 100 mg Q8H   senna-docusate 8.6-50 mg 1 tablet Daily   PRN    Dextrose 10% Bolus  12.5 g PRN   Dextrose 10% Bolus 25 g PRN   fentaNYL 25 mcg Q2H PRN   glucagon (human recombinant) 1 mg PRN   insulin aspart U-100 1-10 Units Q6H PRN   pneumoc 13-quita conj-dip cr(PF) 0.5 mL Prior to discharge   sodium chloride 0.9% 10 mL PRN   sodium chloride 0.9% 10 mL PRN     Today I personally reviewed pertinent medications, lines/drains/airways, imaging, cardiology results, laboratory results, microbiology results, notably:    Diet  Diet NPO  Diet NPO

## 2019-07-09 NOTE — DISCHARGE SUMMARY
Discharge Summary  Critical Care    Admit Date: 7/3/2019    Discharge Date:     LOS: 5    Principle Diagnosis: Cerebral infarction due to bilateral embolism of middle cerebral arteries    Secondary Diagnoses:   Active Hospital Problems    Diagnosis  POA    *Cerebral infarction due to bilateral embolism of middle cerebral arteries [I63.413]  Yes    Palliative care encounter [Z51.5]  Not Applicable    Pneumothorax [J93.9]  No     Patient with pigtail catheter in place.  - Continue Pigtail on suction      Cardiogenic shock [R57.0]  Yes    Shock liver [K72.00]  No    Brain compression [G93.5]  Yes    Brainstem herniation [G93.5]  Yes    Goals of care, counseling/discussion [Z71.89]  Not Applicable    Debility [R53.81]  Yes    Maritza coma scale total score 3-8 [R40.2430]  Yes    NSTEMI (non-ST elevated myocardial infarction) [I21.4]  Unknown    Acute renal failure [N17.9]  Unknown    Cytotoxic brain edema [G93.6]  Unknown    Impaired mobility and activities of daily living [Z74.09]  Unknown    Dysphagia [R13.10]  Unknown    Acute respiratory failure with hypercapnia [J96.02]  Unknown    Elevated troponin [R74.8]  Yes    Cerebral infarction due to embolism of left middle cerebral artery [I63.412]  Yes      Resolved Hospital Problems   No resolved problems to display.      HPI:     Mr Cristobal  is a 74 yo male with unknown prior medical history who presents to [facility] s/p tPA. LKN was at 1830 he began to drool, developed R sided weakness and facial droop. He was given tPA @ 1955. HE became agitated en route and was given multiple doses of versed. He was intubated upon arrival in the ED due to lethargy and inability to protect  his airway. CTA showed a L ICA terminus occlusion. He was take to IR. He is being admitted to [facility] for a higher level of care.      Hospital Course by Problem:    7/4:  Admit NCC , CTA, IR   7/4-7/7: Neurological exam continued to get worse and developed acute MI and cardiogenic shock  with multiorgan failure  7/8: Neurological exam is compatible with brain death. Will plan an apnea test after correcting confounding metabolic issues   7/9: Plans for apnea testing today         Significant Laboratory Data:  HbA1C:   Lab Results   Component Value Date    HGBA1C 5.2 07/04/2019     TSH:   Lab Results   Component Value Date    TSH 1.778 07/04/2019     Lipids:   Lab Results   Component Value Date    CHOL 170 07/04/2019     Lab Results   Component Value Date    HDL 34 (L) 07/04/2019     Lab Results   Component Value Date    LDLCALC 115.2 07/04/2019     Lab Results   Component Value Date    TRIG 104 07/04/2019     Lab Results   Component Value Date    CHOLHDL 20.0 07/04/2019         Consultations:  IP CONSULT TO VASCULAR (STROKE) NEUROLOGY  IP CONSULT TO REGISTERED DIETITIAN/NUTRITIONIST  IP CONSULT TO SOCIAL WORK/CASE MANAGEMENT  IP CONSULT TO CARDIOLOGY  IP CONSULT TO GENERAL SURGERY  IP CONSULT TO PALLIATIVE CARE          Discharge Medications:  There are no discharge medications for this patient.             Studies Pending: None    Discharge Disposition:  Patient was pronounced brain dead on 7/9/19 at 13:36 and family was at bedside.

## 2019-07-09 NOTE — PROGRESS NOTES
Thoracic Surgery Progress Note    Patient seen and examined. On minimal vent settings. Chest tube without chest leak. CT with 10ml serosang output.    Continue chest tube to suction.    Rosalinda Crane MD, PGY-4  General Surgery  [phone]

## 2019-07-09 NOTE — PROGRESS NOTES
"[facility]  Adult Nutrition  Progress Note    SUMMARY       Recommendations    Recommendation/Intervention:     1. If unable to extubate <72hrs, recommend starting TF.    Impact Peptide @ goal rate 45mL/hr.    - Initiate @ 15mL/hr and increase by 10mL q4hrs, or as tolerated, until goal rate is reached.    - Hold for residuals >500mL.    - Provides 1620kcals, 101g protein and 832mL free water.     2. If able to extubate and advance diet, recommend Cardiac with texture per SLP recommendations.     RD to monitor.    Nutrition Goal Status: goal not met  Communication of RD Recs: reviewed with RN    Reason for Assessment    Reason For Assessment: RD follow-up  Diagnosis: stroke/CVA  Relevant Medical History: CAD, CHF  Interdisciplinary Rounds: did not attend  General Information Comments: Pt intubated with family members a bedside. Brain death study planned. Family reports normal appetite/po intake PTA with wt gain. Unknown UBW. States that "he looks the same". NFPE completed. RD does not feel that pt meets malnutrition criteria with current information. Will continue to monitor.  Nutrition Discharge Planning: unable to determine at this time    Nutrition Risk Screen    Nutrition Risk Screen: dysphagia or difficulty swallowing    Nutrition/Diet History    Spiritual, Cultural Beliefs, Christian Practices, Values that Affect Care: no  Factors Affecting Nutritional Intake: NPO, on mechanical ventilation    Anthropometrics    Temp: 98 °F (36.7 °C)  Height Method: Stated  Height: 5' 11" (180.3 cm)  Height (inches): 71 in  Weight Method: Bed Scale  Weight: 59.9 kg (132 lb)  Weight (lb): 132 lb  Ideal Body Weight (IBW), Male: 172 lb  % Ideal Body Weight, Male (lb): 76.74 lb  BMI (Calculated): 18.4  BMI Grade: 17 - 18.4 protein-energy malnutrition grade I     Lab/Procedures/Meds    Pertinent Labs Reviewed: reviewed  Pertinent Labs Comments: Na 155  Pertinent Medications Reviewed: reviewed  Pertinent " Medications Comments: docusate, aspirin, vasopressin    Estimated/Assessed Needs    Weight Used For Calorie Calculations: 60 kg (132 lb 4.4 oz)  Energy Calorie Requirements (kcal): 1593  Energy Need Method: [place]  Protein Requirements: 72-90g(1.2-1.5g/kg)  Weight Used For Protein Calculations: 60 kg (132 lb 4.4 oz)  Fluid Requirements (mL): 1mL/kcal or per MD     RDA Method (mL): 1593      Nutrition Prescription Ordered    Current Diet Order: NPO    Evaluation of Received Nutrient/Fluid Intake    % Intake of Estimated Energy Needs: 0 - 25 %  % Meal Intake: NPO    Nutrition Risk    Level of Risk/Frequency of Follow-up: high(f/u 2x/week)     Assessment and Plan    Nutrition Problem  Inadequate energy intake     Related to (etiology):   NPO     Signs and Symptoms (as evidenced by):   Pt receiving <85% EEN and EPN.      Intervention:  Collaboration of nutrition care with providers     Nutrition Diagnosis Status:   Continues     Monitor and Evaluation    Food and Nutrient Intake: energy intake, food and beverage intake, enteral nutrition intake  Food and Nutrient Adminstration: diet order, enteral and parenteral nutrition administration  Anthropometric Measurements: weight, weight change, body mass index  Biochemical Data, Medical Tests and Procedures: electrolyte and renal panel, gastrointestinal profile, glucose/endocrine profile, inflammatory profile, lipid profile  Nutrition-Focused Physical Findings: overall appearance     Malnutrition Assessment                 Upper Arm Region (Subcutaneous Fat Loss): (LAY)   [place] Region (Muscle Loss): moderate depletion  Clavicle Bone Region (Muscle Loss): moderate depletion  Clavicle and Acromion Bone Region (Muscle Loss): moderate depletion  Anterior Thigh Region (Muscle Loss): (LAY)  Posterior Calf Region (Muscle Loss): (LAY)                 Nutrition Follow-Up    RD Follow-up?: Yes

## 2019-07-09 NOTE — PLAN OF CARE
19 1444   Final Note   Assessment Type Final Discharge Note   Anticipated Discharge Disposition        Per MD:   Time of death: 13:36    Brandy Estrella RN, CCRN-K, [facility]  Neuro-Critical Care   [phone]

## 2019-07-09 NOTE — SUBJECTIVE & OBJECTIVE
Neurologic Chief Complaint: Cerebral infarction throughout bilateral MCA and L YRIS distribution.    Subjective:     Interval History:  Neuro exam and prognosis remains poor. Both pupils are now fixed. No cough reflex, no gag reflex, and no corneal reflex present. CTH completed on 7/6 revealed interval development of diffuse cerebral edema and leftward midline shift. Plans for brain death exam and apnea test. Pt is DNR.    HPI, Past Medical, Family, and Social History remains the same as documented in the initial encounter.     Review of Systems   Unable to perform ROS: Intubated     Scheduled Meds:   sodium chloride 0.9%  2,000 mL Intravenous Once    aspirin  81 mg Per NG tube Daily    cefTRIAXone (ROCEPHIN) IVPB  2 g Intravenous Q24H    And    metronidazole  500 mg Intravenous Q8H    chlorhexidine  15 mL Mouth/Throat BID    famotidine  20 mg Per OG tube Daily    hydrocortisone sodium succinate  100 mg Intravenous Q8H    senna-docusate 8.6-50 mg  1 tablet Per OG tube Daily     Continuous Infusions:   norepinephrine bitartrate-D5W 0.18 mcg/kg/min (07/09/19 0705)    vasopressin (PITRESSIN) infusion 0.04 Units/min (07/09/19 0816)     PRN Meds:Dextrose 10% Bolus, Dextrose 10% Bolus, fentaNYL, glucagon (human recombinant), insulin aspart U-100, pneumoc 13-quita conj-dip cr(PF), sodium chloride 0.9%, sodium chloride 0.9%    Objective:     Vital Signs (Most Recent):  Temp: 98.8 °F (37.1 °C) (07/09/19 0705)  Pulse: 84 (07/09/19 0756)  Resp: 20 (07/09/19 0756)  BP: (!) 164/98 (07/09/19 0705)  SpO2: 100 % (07/09/19 0756)  BP Location: Left arm    Vital Signs Range (Last 24H):  Temp:  [97.2 °F (36.2 °C)-98.8 °F (37.1 °C)]   Pulse:  [70-98]   Resp:  [0-20]   BP: (123-164)/(71-98)   SpO2:  [100 %]   Arterial Line BP: (145-170)/(72-88)   BP Location: Left arm    Physical Exam   HENT:   Head: Normocephalic and atraumatic.   Right Ear: External ear normal.   Left Ear: External ear normal.   Nose: Nose normal.   ETT in place    Eyes: Right conjunctiva is injected. Left conjunctiva is injected.   Cardiovascular: Normal rate.   No murmur heard.  Abdominal: He exhibits no distension.   Neurological: He is unresponsive. GCS eye subscore is 1. GCS verbal subscore is 1. GCS motor subscore is 3.   Skin: Capillary refill takes 2 to 3 seconds. He is not diaphoretic.   Psychiatric: He is withdrawn.   Neurological Exam:   LOC: comatose   Attention Span: poor  Language: does not follow commands  Articulation: LAY intubated  Orientation: LAY intubated  Visual Fields: no BTT mac  Pupils (CN II, III):  R- fixed. L- fixed  Facial Sensation (CN V): corneal reflex absent bilat  Motor: extensor posturing on upper extremities, slight triple flex on LE    No gag reflex  No cough reflex    Laboratory:  CMP:   Recent Labs   Lab 07/08/19 2329 07/09/19  0523   CALCIUM 8.4*  --    ALBUMIN 1.7*  --    PROT 8.7*  --    *  155* 155*   K 4.6  --    CO2 23  --    *  --    BUN 59*  --    CREATININE 2.8*  --    ALKPHOS 56  --    ALT 1,173*  --    AST 1,029*  --    BILITOT 0.4  --      CBC:   Recent Labs   Lab 07/08/19 2329   WBC 12.50   RBC 3.22*   HGB 9.4*   HCT 30.0*   PLT 85*   MCV 93   MCH 29.2   MCHC 31.3*       Diagnostic Results     Brain Imaging     CT head 7/6/2019  Evolving bilateral MCA and left YRIS distribution infarcts similar to recent MRI brain.    Interval development of diffuse cerebral edema and leftward midline shift measuring in the order of 8 mm.    Subarachnoid hemorrhage.    07.04.2019 - MRI Brain - Large acute strokes throughout the bilateral MCA and left YRIS distributions.  No midline shift, herniation, hydrocephalus or hemorrhagic conversion.    Vessel imaging       IR angio 07/04/2019  Left MCA M1 segment occlusion was noted.  This was treated with embolectomy using aspiration and 2 passes.  There was TICI 3 reperfusion.    Right M1 stump occlusion which is considered most likely to be chronic with good leptomeningeal collateral  flow to the MCA territory.  This is not treated, because the patient only presented with a left MCA syndrome      CTA stroke MP 7/4/2019    Noncontrast head CT demonstrates changes of acute infarction involving the left MCA territory including the insula, corona radiata and cortex of the lateral frontal and parietal lobes without hemorrhage.    Left ICA terminus and M1 segment occlusion which appears acute consistent with the patient's acute left MCA syndrome.    Right M1 segment occlusion with a 1.3 cm length of occlusion and distal flow by collaterals. This is likely chronically occluded.    High-grade stenosis of the V4 segment of the left vertebral artery.    Subsegmental ground-glass opacity of the right upper lobe with right-sided pleural effusion.

## 2019-07-09 NOTE — PT/OT/SLP DISCHARGE
Occupational Therapy Discharge Summary    Tevin Cristobal  MRN: [id number]   Principal Problem: Cerebral infarction due to bilateral embolism of middle cerebral arteries      Patient Discharged from acute Occupational Therapy on .  Assessment:      Patient     Objective:     GOALS:   Multidisciplinary Problems     Occupational Therapy Goals     Not on file          Multidisciplinary Problems (Resolved)        Problem: Occupational Therapy Goal    Goal Priority Disciplines Outcome Interventions   Occupational Therapy Goal   (Resolved)     OT, PT/OT Outcome(s) achieved    Description:  Goals set  to be addressed for 14 days with expiration date, :  Patient will increase functional independence with ADLs by performing:    Patient will demonstrate rolling to the right with max assist.  Not met   Patient will demonstrate rolling to the left with max assist.   Not met  Patient will demonstrate supine -sit with max  assist.   Not met  Patient will demonstrate squat pivot transfers with max assist.   Not met  Patient will demonstrate grooming while seated with max assist.   Not met  Patient will demonstrate upper body dressing with max assist while seated EOB.   Not met  Patient will demonstrate lower body dressing with max assist while seated EOB.   Not met  Patient will demonstrate toileting with max assist.   Not met  Patient's family / caregiver will demonstrate independence and safety with assisting patient with self-care skills and functional mobility.     Not met  Patient's family / caregiver will demonstrate independence with providing ROM and changes in bed positioning.   Not met  Patient and/or patient's family will verbalize understanding of stroke prevention guidelines, personal risk factors and stroke warning signs via teachback method.  Not met                           Reasons for Discontinuation of Therapy Services  patient       Plan:     Patient Discharged to: d.c from [facility]  ANA To  7/9/2019

## 2019-07-09 NOTE — PROGRESS NOTES
Pt brain dead per NCC team. Vent to be withdrawn. Pt's daughter and  at bedside. Pt's Aunt outside of room. Support given to Aunt.     Jennifer EMMANUEL, APRN, AGCNS

## 2019-07-09 NOTE — PROGRESS NOTES
[facility]  Neurocritical Care  Progress Note    Admit Date: 7/3/2019  Service Date: 07/09/2019  Length of Stay: 5    Subjective:     Chief Complaint: Cerebral infarction due to bilateral embolism of middle cerebral arteries    History of Present Illness:        Mr Cristobal  is a 72 yo male with unknown prior medical history who presents to [facility] s/p tPA. LKN was at 1830 he began to drool, developed R sided weakness and facial droop. He was given tPA @ 1955. HE became agitated en route and was given multiple doses of versed. He was intubated upon arrival in the ED due to lethargy and inability to protect  his airway. CTA showed a L ICA terminus occlusion. He was take to IR. He is being admitted to [facility] for a higher level of care.       Hospital Course: 7/4:  Admit NCC , CTA, IR   7/8: Neurological exam is compatible with brain death. Will plan an apnea test after correcting confounding metabolic issues   7/9: Plans for apnea testing today     Interval History:  >4 elements OR status of 3 inpatient conditions    Review of Systems   Unable to perform ROS: Intubated     2 systems OR Unable to obtain a complete ROS due to level of consciousness.  Objective:     Vitals:  Temp: 98 °F (36.7 °C)  Pulse: 76  Rhythm: normal sinus rhythm  BP: (!) 156/97  MAP (mmHg): 121  Resp: 20  SpO2: 100 %  Oxygen Concentration (%): 40  O2 Device (Oxygen Therapy): ventilator  Vent Mode: A/C  Set Rate: 20 bmp  Vt Set: 400 mL  Pressure Support: 0 cmH20  PEEP/CPAP: 5 cmH20  Peak Airway Pressure: 18 cmH2O  Mean Airway Pressure: 10 cmH20  Plateau Pressure: 0 cmH20    Temp  Min: 97.2 °F (36.2 °C)  Max: 98.8 °F (37.1 °C)  Pulse  Min: 70  Max: 84  BP  Min: 123/71  Max: 166/98  MAP (mmHg)  Min: 92  Max: 126  Resp  Min: 0  Max: 20  SpO2  Min: 100 %  Max: 100 %  Oxygen Concentration (%)  Min: 40  Max: 40    07/08 0701 - 07/09 0700  In: 1708.1 [I.V.:1298.1]  Out: 605 [Urine:595]   Unmeasured Output  Stool Occurrence: 0       Physical  Exam    Constitutional: No apparent distress.   Eyes: Conjunctiva clear, anicteric. Lids no lesions.  Head/Ears/Nose/Mouth/Throat/Neck: Moist mucous membranes. External ears, nose atraumatic.   Cardiovascular: Regular rhythm. ESM  Respiratory: Clear to auscultation. Chest tube in place   Gastrointestinal: No hernia. Soft, nondistended, nontender. + bowel sounds.    Neurologic Examination:  -GCS E1V1M1  Comatose. Absent pupillary response to light. No corneal reflexes bilateral  No gag or cough reflexes  NO VOR  No motor response to noxious stimuli     Medications:  Continuous    norepinephrine bitartrate-D5W Last Rate: 0.1 mcg/kg/min (07/09/19 1100)   vasopressin (PITRESSIN) infusion Last Rate: 0.04 Units/min (07/09/19 1100)   Scheduled    sodium chloride 0.9% 2,000 mL Once   aspirin 81 mg Daily   chlorhexidine 15 mL BID   famotidine 20 mg Daily   hydrocortisone sodium succinate 100 mg Q8H   senna-docusate 8.6-50 mg 1 tablet Daily   PRN    Dextrose 10% Bolus 12.5 g PRN   Dextrose 10% Bolus 25 g PRN   fentaNYL 25 mcg Q2H PRN   glucagon (human recombinant) 1 mg PRN   insulin aspart U-100 1-10 Units Q6H PRN   pneumoc 13-quita conj-dip cr(PF) 0.5 mL Prior to discharge   sodium chloride 0.9% 10 mL PRN   sodium chloride 0.9% 10 mL PRN     Today I personally reviewed pertinent medications, lines/drains/airways, imaging, cardiology results, laboratory results, microbiology results, notably:    Diet  Diet NPO  Diet NPO        Assessment/Plan:     Neuro  * Cerebral infarction due to bilateral embolism of middle cerebral arteries  See above    Brainstem herniation  Plans for brain death exam and apnea test     Cytotoxic brain edema  - see stroke     Cerebral infarction due to embolism of left middle cerebral artery    Neurological exam is compatible with brain death. Will plan an apnea test today    Pulmonary  Pneumothorax  Chest tube management per surgery     Acute respiratory failure with hypercapnia  Continue current vent  settings  ABG and CXR      Cardiac/Vascular  Cardiogenic shock  MAP>65, wean pressors as tolerated     NSTEMI (non-ST elevated myocardial infarction)  Poor prognosis   Likely brain dead. No intervention at this point     Renal/  Acute renal failure  No dialysis      GI  Shock liver  No acute changes   Ammonia level is stable     Dysphagia  - NPO      Other  Goals of care, counseling/discussion  Discussed with daughter. Plans for brain death exam today           The patient is being Prophylaxed for:  Venous Thromboembolism with: Mechanical  Stress Ulcer with: H2B  Ventilator Pneumonia with: chlorhexidine oral care    Activity Orders          Diet NPO: NPO starting at 07/04 0008        DNR    Uninterrupted Critical Care/Counseling Time (not including procedures): 35 minutes     Mariana Bray MD  Neurocritical Care  [facility]

## 2019-07-09 NOTE — ASSESSMENT & PLAN NOTE
"Patient is a 74 yo male with PMHx of CAD and CHF p/w LMCA syndrome started one hour prior to tele-stroke, received tPA and transferred to [facility] for ICU evaluation. Patient got agitated on the way to [facility] which received Versed, upon arrival patient in respiratory distress, s/p intubation, CTA  revealed left carotid terminus occlusion.    - MRI on 7/4 with Large, acute CVA throughout bilateral MCA and L YRIS distribution. No shift, herniation, hydrocephalus, or hemorrhagic conversion noted.     - 7/4 - L MCA thrombectomy     CTH on 7/6 with Evolving bilateral MCA and left YRIS distribution infarcts similar to recent MRI brain. Interval development of diffuse cerebral edema and leftward midline shift measuring in the order of 8 mm and subarachnoid hemorrhage.    NCC had family discussion regarding "dismal prognosis and impending brain death". Family wishes to keep patient full code.     Antithrombotics for secondary stroke prevention: Antiplatelets: ASA  Statins for secondary stroke prevention and hyperlipidemia, if present:   Statins: None: Reason: discontinued d/t elevated liver enzymes    Aggressive risk factor modification: CAD     Rehab efforts: The patient has been evaluated by a stroke team provider and the therapy needs have been fully considered based off the presenting complaints and exam findings. The following therapy evaluations are needed: PT evaluate and treat, OT evaluate and treat, SLP evaluate and treat -- Further assessment pending extubation    · Diagnostics ordered/pending: none    VTE prophylaxis: None: Reason for No Pharmacological VTE Prophylaxis: Mechanical prophylaxis: Place SCDs    BP parameters: Infarct: Post sucessful thrombectomy, SBP <140      "

## 2019-07-09 NOTE — SIGNIFICANT EVENT
Brain Death Note  Neurocritical Care    Irreversible, proximate cause: Stroke and brain herniation   [x] No paralytics or sedatives by history, levels, or testing, or expected clearance of paralytics or sedatives after >=5 half-lives  [x] No severe electrolyte, acid-base, or endocrine disturbances  [x] T >=36 C  [x] MAP >=65 or SBP >=100    Temp: 98 °F (36.7 °C) (07/09/19 1105)  Pulse: 81 (07/09/19 1339)  Resp: 20 (07/09/19 1339)  BP: (!) 163/97 (07/09/19 1305)  SpO2: 100 % (07/09/19 1339)    Chem:   Recent Labs   Lab 07/08/19 2329  07/09/19  1212   *  155*   < > 154*   K 4.6  --   --    *  --   --    CO2 23  --   --    BUN 59*  --   --    CREATININE 2.8*  --   --    *  --   --    CALCIUM 8.4*  --   --    MG 2.4  --   --    PHOS 5.0*  --   --    AST 1,029*  --   --    ALT 1,173*  --   --    ALKPHOS 56  --   --    BILITOT 0.4  --   --    ALBUMIN 1.7*  --   --    PROT 8.7*  --   --    TROPONINI 6.950*  --   --     < > = values in this interval not displayed.     Heme:   Recent Labs   Lab 07/08/19 2329   WBC 12.50   HGB 9.4*   PLT 85*         Neurologic examination:  Comatose. No response to central or peripheral pain in eyes, face, arms, or legs.  No pupillary reflex bilateral. No corneal reflex bilateral. No oculocephalic reflex bilateral. No cold caloric oculovestibular reflex bilateral. No gag reflex. No cough reflex.    Apnea test: After 10 min of apnea, no respirations were observed and PaCO2 crystal to >60 mmHg or by more than 20 mmHg.    Lab Results   Component Value Date/Time    PH 7.218 (LL) 07/09/2019 01:36 PM    PH 7.448 07/09/2019 01:12 PM    PCO2 78.0 (HH) 07/09/2019 01:36 PM    PCO2 37.4 07/09/2019 01:12 PM    PO2 422 (H) 07/09/2019 01:36 PM    PO2 195 (H) 07/09/2019 01:12 PM    HCO3 31.8 (H) 07/09/2019 01:36 PM    HCO3 25.9 07/09/2019 01:12 PM    BE 4 07/09/2019 01:36 PM    BE 2 07/09/2019 01:12 PM          Time of death: 13:36    This determination is diagnostic of brain death.

## 2019-07-09 NOTE — PROGRESS NOTES
[facility]  Vascular Neurology  Comprehensive Stroke Center  Progress Note    Assessment/Plan:     * Cerebral infarction due to bilateral embolism of middle cerebral arteries  Patient is a 72 yo male with PMHx of CAD and CHF p/w LMCA syndrome started one hour prior to tele-stroke, received tPA and transferred to [facility] for ICU evaluation. Patient got agitated on the way to [facility] which received Versed, upon arrival patient in respiratory distress, s/p intubation, CTA  revealed left carotid terminus occlusion.    - MRI on 7/4 with Large, acute CVA throughout bilateral MCA and L YRIS distribution. No shift, herniation, hydrocephalus, or hemorrhagic conversion noted.   - 7/4 - L MCA thrombectomy   CTH on 7/6 with Evolving bilateral MCA and left YRIS distribution infarcts similar to recent MRI brain. Interval development of diffuse cerebral edema and leftward midline shift measuring in the order of 8 mm and subarachnoid hemorrhage.    - 7/9 -   Pt declared brain dead today. (+) Apnea Test with comatose, areflexive neuro exam.       Antithrombotics for secondary stroke prevention: Antiplatelets: ASA  Statins for secondary stroke prevention and hyperlipidemia, if present:   Statins: None: Reason: discontinued d/t elevated liver enzymes    Aggressive risk factor modification: CAD     Rehab efforts: The patient has been evaluated by a stroke team provider and the therapy needs have been fully considered based off the presenting complaints and exam findings. The following therapy evaluations are needed: PT evaluate and treat, OT evaluate and treat, SLP evaluate and treat -- Further assessment pending extubation    · Diagnostics ordered/pending: none    VTE prophylaxis: None: Reason for No Pharmacological VTE Prophylaxis: Mechanical prophylaxis: Place SCDs    BP parameters: Infarct: Post sucessful thrombectomy, SBP <140        Pneumothorax  S/p chest tube      Acute renal failure  -- Management per  "NCC    NSTEMI (non-ST elevated myocardial infarction)  Management per NCC/Cardiology    Acute respiratory failure with hypercapnia  -- Management per NCC  -- Now with L Pneuomothorax - chest tube in place  -- On CFX/Flagyl for presumed aspiration PNA    Cytotoxic brain edema  Area of cytotoxic cerebral edema identified when reviewing brain imaging in the territory of the bilateral middle cerebral arteries. There is mass effect associated with it. We will continue to monitor the patients clinical exam for any worsening of symptoms which may indicate expansion of the stroke or the area of the edema resulting in the clinical change. The pattern is suggestive of emobolic etiology.    CTH 7/6 revealed increased edema, impending herniation       7/6/2019- patient with poor prognosis and neuro exam. NCC to have family discussion about code status.     7/7/2019- Neuro exam and prognosis remains poor. CTH completed on 7/6 revealed interval development of diffuse cerebral edema and leftward midline shift . NCC had family discussion regarding "dismal prognosis and impending brain death". Family wishes to keep patient full code.     7/8/2019 - Worsening neuro exam. Unable to illicit reflexes.   7/9/2019 - Plans for brain death exam and apnea test     STROKE DOCUMENTATION   Acute Stroke Times   Last Known Normal Date: 07/03/19  Last Known Normal Time: 1830  Symptom Onset Date: 07/03/19  Stroke Team Arrival Date: 07/03/19  Stroke Team Arrival Time: 1924  CT Interpretation Time: 1932  Decision to Treat Time for Alteplase: 1940  Decision to Treat Time for IR: 0032    NIH Scale:  1a. Level of Consciousness: 3-->Responds only with reflex motor or autonomic effects or totally unresponsive, flaccid, and areflexic  1b. LOC Questions: 2-->Answers neither question correctly  1c. LOC Commands: 2-->Performs neither task correctly  2. Best Gaze: 2-->Forced deviation, or total gaze paresis not overcome by the oculocephalic maneuver  3. " Visual: 3-->Bilateral hemianopia (blind including cortical blindness)  4. Facial Palsy: 0-->Normal symmetrical movements  5a. Motor Arm, Left: 3-->No effort against gravity, limb falls  5b. Motor Arm, Right: 3-->No effort against gravity, limb falls  6a. Motor Leg, Left: 3-->No effort against gravity, leg falls to bed immediately  6b. Motor Leg, Right: 3-->No effort against gravity, leg falls to bed immediately  7. Limb Ataxia: 0-->Absent  8. Sensory: 2-->Severe to total sensory loss, patient is not aware of being touched in the face, arm, and leg  9. Best Language: 3-->Mute, global aphasia, no usable speech or auditory comprehension  10. Dysarthria: (UN) Intubated or other physical barrier  11. Extinction and Inattention (formerly Neglect): 2-->Profound sydnee-inattention/extinction more than 1 modality  Total (NIH Stroke Scale): 31      Maritza Coma Scale:  3     Neurologic Chief Complaint: Cerebral infarction throughout bilateral MCA and L YRIS distribution.    Subjective:     Interval History:  Neuro exam and prognosis remains poor. Both pupils are now fixed. No cough reflex, no gag reflex, and no corneal reflex present. CTH completed on 7/6 revealed interval development of diffuse cerebral edema and leftward midline shift. Plans for brain death exam and apnea test. Pt is DNR.    HPI, Past Medical, Family, and Social History remains the same as documented in the initial encounter.     Review of Systems   Unable to perform ROS: Intubated     Scheduled Meds:   sodium chloride 0.9%  2,000 mL Intravenous Once    aspirin  81 mg Per NG tube Daily    cefTRIAXone (ROCEPHIN) IVPB  2 g Intravenous Q24H    And    metronidazole  500 mg Intravenous Q8H    chlorhexidine  15 mL Mouth/Throat BID    famotidine  20 mg Per OG tube Daily    hydrocortisone sodium succinate  100 mg Intravenous Q8H    senna-docusate 8.6-50 mg  1 tablet Per OG tube Daily     Continuous Infusions:   norepinephrine bitartrate-D5W 0.18 mcg/kg/min  (07/09/19 0705)    vasopressin (PITRESSIN) infusion 0.04 Units/min (07/09/19 0816)     PRN Meds:Dextrose 10% Bolus, Dextrose 10% Bolus, fentaNYL, glucagon (human recombinant), insulin aspart U-100, pneumoc 13-quita conj-dip cr(PF), sodium chloride 0.9%, sodium chloride 0.9%    Objective:     Vital Signs (Most Recent):  Temp: 98.8 °F (37.1 °C) (07/09/19 0705)  Pulse: 84 (07/09/19 0756)  Resp: 20 (07/09/19 0756)  BP: (!) 164/98 (07/09/19 0705)  SpO2: 100 % (07/09/19 0756)  BP Location: Left arm    Vital Signs Range (Last 24H):  Temp:  [97.2 °F (36.2 °C)-98.8 °F (37.1 °C)]   Pulse:  [70-98]   Resp:  [0-20]   BP: (123-164)/(71-98)   SpO2:  [100 %]   Arterial Line BP: (145-170)/(72-88)   BP Location: Left arm    Physical Exam   HENT:   Head: Normocephalic and atraumatic.   Right Ear: External ear normal.   Left Ear: External ear normal.   Nose: Nose normal.   ETT in place   Eyes: Right conjunctiva is injected. Left conjunctiva is injected.   Cardiovascular: Normal rate.   No murmur heard.  Abdominal: He exhibits no distension.   Neurological: He is unresponsive. GCS eye subscore is 1. GCS verbal subscore is 1. GCS motor subscore is 3.   Skin: Capillary refill takes 2 to 3 seconds. He is not diaphoretic.   Psychiatric: He is withdrawn.   Neurological Exam:   LOC: comatose   Attention Span: poor  Language: does not follow commands  Articulation: LAY intubated  Orientation: LAY intubated  Visual Fields: no BTT mac  Pupils (CN II, III):  R- fixed. L- fixed  Facial Sensation (CN V): corneal reflex absent bilat  Motor: extensor posturing on upper extremities, slight triple flex on LE    No gag reflex  No cough reflex    Laboratory:  CMP:   Recent Labs   Lab 07/08/19  8093 07/09/19  0523   CALCIUM 8.4*  --    ALBUMIN 1.7*  --    PROT 8.7*  --    *  155* 155*   K 4.6  --    CO2 23  --    *  --    BUN 59*  --    CREATININE 2.8*  --    ALKPHOS 56  --    ALT 1,173*  --    AST 1,029*  --    BILITOT 0.4  --      CBC:    Recent Labs   Lab 07/08/19  2329   WBC 12.50   RBC 3.22*   HGB 9.4*   HCT 30.0*   PLT 85*   MCV 93   MCH 29.2   MCHC 31.3*       Diagnostic Results     Brain Imaging     CT head 7/6/2019  Evolving bilateral MCA and left YRIS distribution infarcts similar to recent MRI brain.    Interval development of diffuse cerebral edema and leftward midline shift measuring in the order of 8 mm.    Subarachnoid hemorrhage.    07.04.2019 - MRI Brain - Large acute strokes throughout the bilateral MCA and left YRIS distributions.  No midline shift, herniation, hydrocephalus or hemorrhagic conversion.    Vessel imaging       IR angio 07/04/2019  Left MCA M1 segment occlusion was noted.  This was treated with embolectomy using aspiration and 2 passes.  There was TICI 3 reperfusion.    Right M1 stump occlusion which is considered most likely to be chronic with good leptomeningeal collateral flow to the MCA territory.  This is not treated, because the patient only presented with a left MCA syndrome      CTA stroke MP 7/4/2019    Noncontrast head CT demonstrates changes of acute infarction involving the left MCA territory including the insula, corona radiata and cortex of the lateral frontal and parietal lobes without hemorrhage.    Left ICA terminus and M1 segment occlusion which appears acute consistent with the patient's acute left MCA syndrome.    Right M1 segment occlusion with a 1.3 cm length of occlusion and distal flow by collaterals. This is likely chronically occluded.    High-grade stenosis of the V4 segment of the left vertebral artery.    Subsegmental ground-glass opacity of the right upper lobe with right-sided pleural effusion.      Thank you for the consult. We will sign off at this time. Please contact us with any questions or concerns.     Pacheco Davis MD  Comprehensive Stroke Center  Department of Vascular Neurology   [facility]

## 2019-07-09 NOTE — PLAN OF CARE
Problem: Adult Inpatient Plan of Care  Goal: Plan of Care Review  POC reviewed with pt and family at 0300. Pt was not able to verbalize understanding, but family verbalized understanding. Questions and concerns addressed. No acute events overnight. Pt still GCS of 3. Brain death study planned for tomorrow during AM. DNR status. UO dark and concentrated. Vaso gtt currently at 0.04. Levo gtt at 0.18. Will continue to monitor. See flowsheets for full assessment and VS info

## 2019-07-10 NOTE — PHYSICIAN QUERY
"PT Name: Tevin Cristobal  MR #: [id number]    Physician Query Form - Heart  Condition Clarification     CDS: Danielle Weston RN  Contact information: [email or web address]  This form is a permanent document in the medical record.   Query Date: July 10, 2019  By submitting this query, we are merely seeking further clarification of documentation. Please utilize your independent clinical judgment when addressing the question(s) below.    The medical record contains the following   Indicators     Supporting Clinical Findings Location in Medical Record   x BNP Does not look overloaded on exam and CVP is WNL but initial BNP was > 4K   Cardiology consult 7/4   x EF Found to have LVEF of 15 with mobile LV thrombus.   Cardiology PN 7/5   x Radiology findings Bilateral diffuse airspace infiltrates/edema, right more than left.  No pneumothorax.  Minimal blunting of the left CP angle could represent trace effusion.    Cardiomediastinal silhouette is enlarged.   CXR 7/4   x Echo Results Transthoracic echo (TTE) complete  Demonstrated:  Moderate left ventricular enlargement.  · Severely decreased left ventricular systolic function. The estimated   ejection fraction is 15%  · Mildly to moderately reduced right ventricular systolic function.  · Grade II (moderate) left ventricular diastolic dysfunction consistent   with pseudonormalization.  · Severe left atrial enlargement.  · Mild tricuspid regurgitation.  · Mild mitral regurgitation.  · 1.5cm mobile thrombus noted in the left ventricular apex.   Cardiology PN 7/5    "Ascites" documented      "SOB" or "GLASGOW" documented      "Hypoxia" documented     x Heart Failure documented We were informed by EMS that BNP and troponins were elevated at the local ED, which may be from hypoxia from the CVA but also could signify concomitant CHF exacerbation.    Patient is a 72 yo male with PMHx of CAD and CHF p/w LMCA syndrome started one hour prior to tele-stroke, received tPA and transferred to [facility] " "for ICU evaluation   Ed provider notes 7/3      VN consult 7/4   x "Edema" documented No dependent edema NCC PN 7/4  Dr. Dumont   x Diuretics/Meds Got a total of 200 mg of lasix today but urine output documented is only 500s   Cardiology consult 7/4    Treatment:     x Other:  Central line emergently placed revealing elevated cvp at 15-17, svo2 44. Pocus with severely reduced ef.   On exam pt had cool extremities w prolonged cap refill, dilated jugular veins b/l up to jaw, and coarse bs.        NSTEMI (non-ST elevated myocardial infarction)  Patient remains comatose. We have a low index of suspicion for Type 1 myocardial infarction   NCC PN 7/4  Dr. Dumont          Cardiology consult 7/4   Heart failure (HF) can be acute, chronic or both. It is generally further specificed as systolic, diastolic, or combined. Lastly, it is important to identify an underlying etiology if known or suspected.     Common clues to acute exacerbation:  Rapidly progressive symptoms (w/in 2 weeks of presentation), using IV diuretics to treat, using supplemental O2, pulmonary edema on Xray, MI w/in 4 weeks, and/or BNP >500    Systolic Heart Failure: is defined as chart documentation of a left ventricular ejection fraction (LVEF) less than 40%     Diastolic Heart Failure: is defined as a left ventricular ejection fraction (LVEF) greater than 40%   +      Evidence of diastolic dysfunction on echocardiography OR    Right heart catheterization wedge pressure above 12 mm Hg OR    Left heart catheterization left ventricular end diastolic pressure 18 mm Hg or above.    References: *American Heart Association    The clinical guidelines noted below are only system guidelines, and do not replace the providers clinical judgment.     Provider, please further specify the diagnosis associated with above clinical findings.    [  x ] Acute Systolic Heart Failure - New diagnosis.  EF < 40%  and acute HF symptoms documented    [   ] Chronic Systolic " Heart Failure - Pre-existing systolic HF diagnosis.  EF < 40%  without  acute HF symptoms documented                               [   ] Acute Combined Systolic and Diastolic Heart Failure     [   ] Acute on Chronic Combined Systolic and Diastolic Heart Failure                     [   ] Chronic Combined Systolic and Diastolic Heart Failure    [   ] Other (please specify): ___________________________________    [   ] Clinically Undetermined                          Please document in your progress notes daily for the duration of treatment until resolved and include in your discharge summary.